# Patient Record
Sex: MALE | Race: WHITE | Employment: PART TIME | URBAN - METROPOLITAN AREA
[De-identification: names, ages, dates, MRNs, and addresses within clinical notes are randomized per-mention and may not be internally consistent; named-entity substitution may affect disease eponyms.]

---

## 2017-02-06 ENCOUNTER — TELEPHONE (OUTPATIENT)
Dept: FAMILY MEDICINE CLINIC | Facility: CLINIC | Age: 67
End: 2017-02-06

## 2017-02-07 NOTE — TELEPHONE ENCOUNTER
Notified patient that Dr Divina Olivas does not have the disc. Xrays were from [address] [facility].  Patient will contact that group and check his own records to see if he is in possession of disc

## 2017-02-13 ENCOUNTER — OFFICE VISIT (OUTPATIENT)
Dept: FAMILY MEDICINE CLINIC | Facility: CLINIC | Age: 67
End: 2017-02-13

## 2017-02-13 ENCOUNTER — TELEPHONE (OUTPATIENT)
Dept: FAMILY MEDICINE CLINIC | Facility: CLINIC | Age: 67
End: 2017-02-13

## 2017-02-13 VITALS
WEIGHT: 159 LBS | HEART RATE: 74 BPM | SYSTOLIC BLOOD PRESSURE: 110 MMHG | DIASTOLIC BLOOD PRESSURE: 72 MMHG | BODY MASS INDEX: 23 KG/M2 | RESPIRATION RATE: 16 BRPM | TEMPERATURE: 98 F

## 2017-02-13 DIAGNOSIS — R68.89 FLU-LIKE SYMPTOMS: Primary | ICD-10-CM

## 2017-02-13 DIAGNOSIS — N40.0 BENIGN NON-NODULAR PROSTATIC HYPERPLASIA WITHOUT LOWER URINARY TRACT SYMPTOMS: ICD-10-CM

## 2017-02-13 DIAGNOSIS — R10.9 BILATERAL FLANK PAIN: ICD-10-CM

## 2017-02-13 LAB
APPEARANCE: CLEAR
MULTISTIX LOT#: ABNORMAL NUMERIC
PH, URINE: 5.5 (ref 4.5–8)
SPECIFIC GRAVITY: 1.03 (ref 1–1.03)
URINE-COLOR: YELLOW
UROBILINOGEN,SEMI-QN: 0.2 MG/DL (ref 0–1.9)

## 2017-02-13 PROCEDURE — 81003 URINALYSIS AUTO W/O SCOPE: CPT | Performed by: FAMILY MEDICINE

## 2017-02-13 PROCEDURE — 87086 URINE CULTURE/COLONY COUNT: CPT | Performed by: FAMILY MEDICINE

## 2017-02-13 PROCEDURE — 99214 OFFICE O/P EST MOD 30 MIN: CPT | Performed by: FAMILY MEDICINE

## 2017-02-13 NOTE — PROGRESS NOTES
Chief Complaint:   Patient presents with:  Flu    HPI:   This is a 74year old male presenting with flulike symptoms for the past 1 week. He describes bilateral flank pain with radiation to suprapubic region.   Patient has a history of BPH he reports incre polydipsia, polyphagia and polyuria. Genitourinary: Positive for urgency, flank pain and nocturia. Negative for dysuria, hematuria and difficulty urinating. Musculoskeletal: Positive for myalgias and joint pain.  Negative for gait problem, neck pain and distension. There is no tenderness. There is no rebound and no guarding. Bilateral flank tenderness    Musculoskeletal: Normal range of motion. He exhibits no tenderness or effusion. Lymphadenopathy:     He has no cervical adenopathy.    Neurological: H

## 2017-02-14 ENCOUNTER — LAB ENCOUNTER (OUTPATIENT)
Dept: LAB | Age: 67
End: 2017-02-14
Attending: FAMILY MEDICINE
Payer: MEDICARE

## 2017-02-14 ENCOUNTER — TELEPHONE (OUTPATIENT)
Dept: FAMILY MEDICINE CLINIC | Facility: CLINIC | Age: 67
End: 2017-02-14

## 2017-02-14 ENCOUNTER — HOSPITAL ENCOUNTER (OUTPATIENT)
Dept: ULTRASOUND IMAGING | Age: 67
Discharge: HOME | End: 2017-02-14
Attending: FAMILY MEDICINE
Payer: MEDICARE

## 2017-02-14 DIAGNOSIS — R10.9 BILATERAL FLANK PAIN: ICD-10-CM

## 2017-02-14 DIAGNOSIS — R68.89 FLU-LIKE SYMPTOMS: ICD-10-CM

## 2017-02-14 DIAGNOSIS — N40.0 BENIGN NON-NODULAR PROSTATIC HYPERPLASIA WITHOUT LOWER URINARY TRACT SYMPTOMS: ICD-10-CM

## 2017-02-14 LAB
ALBUMIN SERPL-MCNC: 3.7 G/DL (ref 3.5–4.8)
ALP LIVER SERPL-CCNC: 61 U/L (ref 45–117)
ALT SERPL-CCNC: 25 U/L (ref 17–63)
AST SERPL-CCNC: 17 U/L (ref 15–41)
BASOPHILS # BLD AUTO: 0.04 X10(3) UL (ref 0–0.1)
BASOPHILS NFR BLD AUTO: 0.9 %
BILIRUB SERPL-MCNC: 0.4 MG/DL (ref 0.1–2)
BUN BLD-MCNC: 15 MG/DL (ref 8–20)
CALCIUM BLD-MCNC: 9.1 MG/DL (ref 8.3–10.3)
CHLORIDE: 100 MMOL/L (ref 101–111)
CO2: 34 MMOL/L (ref 22–32)
CREAT BLD-MCNC: 0.78 MG/DL (ref 0.7–1.3)
EOSINOPHIL # BLD AUTO: 0.2 X10(3) UL (ref 0–0.3)
EOSINOPHIL NFR BLD AUTO: 4.4 %
ERYTHROCYTE [DISTWIDTH] IN BLOOD BY AUTOMATED COUNT: 12.4 % (ref 11.5–16)
GLUCOSE BLD-MCNC: 105 MG/DL (ref 70–99)
HCT VFR BLD AUTO: 40.3 % (ref 37–53)
HGB BLD-MCNC: 13.7 G/DL (ref 13–17)
IMMATURE GRANULOCYTE COUNT: 0.01 X10(3) UL (ref 0–1)
IMMATURE GRANULOCYTE RATIO %: 0.2 %
LYMPHOCYTES # BLD AUTO: 1.8 X10(3) UL (ref 0.9–4)
LYMPHOCYTES NFR BLD AUTO: 39.6 %
M PROTEIN MFR SERPL ELPH: 7.5 G/DL (ref 6.1–8.3)
MCH RBC QN AUTO: 30.6 PG (ref 27–33.2)
MCHC RBC AUTO-ENTMCNC: 34 G/DL (ref 31–37)
MCV RBC AUTO: 90 FL (ref 80–99)
MONOCYTES # BLD AUTO: 0.72 X10(3) UL (ref 0.1–0.6)
MONOCYTES NFR BLD AUTO: 15.8 %
NEUTROPHIL ABS PRELIM: 1.78 X10 (3) UL (ref 1.3–6.7)
NEUTROPHILS # BLD AUTO: 1.78 X10(3) UL (ref 1.3–6.7)
NEUTROPHILS NFR BLD AUTO: 39.1 %
PLATELET # BLD AUTO: 272 10(3)UL (ref 150–450)
POTASSIUM SERPL-SCNC: 4.3 MMOL/L (ref 3.6–5.1)
PSA SERPL-MCNC: 2.7 NG/ML (ref 0.01–4)
RBC # BLD AUTO: 4.48 X10(6)UL (ref 3.8–5.8)
RED CELL DISTRIBUTION WIDTH-SD: 40.6 FL (ref 35.1–46.3)
SODIUM SERPL-SCNC: 137 MMOL/L (ref 136–144)
WBC # BLD AUTO: 4.6 X10(3) UL (ref 4–13)

## 2017-02-14 PROCEDURE — 76770 US EXAM ABDO BACK WALL COMP: CPT

## 2017-02-14 PROCEDURE — 84153 ASSAY OF PSA TOTAL: CPT

## 2017-02-14 PROCEDURE — 85025 COMPLETE CBC W/AUTO DIFF WBC: CPT

## 2017-02-14 PROCEDURE — 36415 COLL VENOUS BLD VENIPUNCTURE: CPT

## 2017-02-14 PROCEDURE — 80053 COMPREHEN METABOLIC PANEL: CPT

## 2017-02-14 RX ORDER — CIPROFLOXACIN 500 MG/1
500 TABLET, FILM COATED ORAL 2 TIMES DAILY
Qty: 20 TABLET | Refills: 0 | Status: SHIPPED | OUTPATIENT
Start: 2017-02-14 | End: 2017-02-24

## 2017-02-14 NOTE — TELEPHONE ENCOUNTER
----- Message from Jerri Zurita MD sent at 2/14/2017 11:21 AM CST -----  Enlarged prostate, why didn't patient get labs done? ? That was important.  Please send over cipro 500 mg po bid x 10 days until we get labs results, i'm treating him for possible prost

## 2017-02-14 NOTE — TELEPHONE ENCOUNTER
Called and spoke with pt. Pt states that he was not aware that he needed to have labs drawn. Pt states he will come in to the office today prior to four in order to have his labs drawn. Pt will also start abt. Abt sent to pharmacy.

## 2017-02-14 NOTE — TELEPHONE ENCOUNTER
Pt came in today for out patient testing and followed up on status of request for a note to clear him to go back to work. Please contact pt to inform.

## 2017-02-14 NOTE — TELEPHONE ENCOUNTER
Pr Dr. Jaquelin Cavazos, we will need the pt's lab results prior to pt receiving letter to return to work. Left detailed message on pt's vm stating that we will need to review lab results prior to pt receiving letter to return to work.  Pt advised to call the office

## 2017-02-15 NOTE — TELEPHONE ENCOUNTER
Pt called requesting letter to return back to work. [phone] [address] Spencer for letter? please advise.    Thank you

## 2017-02-15 NOTE — TELEPHONE ENCOUNTER
Pt calling back, labs were done yesterday and he would like to know if he will be cleared for work now. Please call on Home number.

## 2017-02-15 NOTE — TELEPHONE ENCOUNTER
Per kathia Vaughn for letter. Pt to only take cipro for 7 days not 10. Pt to let  know if symptoms persist after he's completed Rx. Pt informed of MD message. Pt states understanding and will  letter at  later today.    Pt has

## 2017-02-24 ENCOUNTER — TELEPHONE (OUTPATIENT)
Dept: FAMILY MEDICINE CLINIC | Facility: CLINIC | Age: 67
End: 2017-02-24

## 2017-03-02 ENCOUNTER — TELEPHONE (OUTPATIENT)
Dept: FAMILY MEDICINE CLINIC | Facility: CLINIC | Age: 67
End: 2017-03-02

## 2017-03-03 RX ORDER — METHOCARBAMOL 500 MG/1
500 TABLET, FILM COATED ORAL EVERY 8 HOURS PRN
COMMUNITY
End: 2017-03-31 | Stop reason: ALTCHOICE

## 2017-03-06 ENCOUNTER — HOSPITAL ENCOUNTER (OUTPATIENT)
Dept: PHYSICAL THERAPY | Facility: HOSPITAL | Age: 67
Setting detail: THERAPIES SERIES
Discharge: HOME | End: 2017-03-06
Attending: ORTHOPAEDIC SURGERY
Payer: MEDICARE

## 2017-03-06 ENCOUNTER — APPOINTMENT (OUTPATIENT)
Dept: LAB | Facility: HOSPITAL | Age: 67
End: 2017-03-06
Attending: ORTHOPAEDIC SURGERY
Payer: MEDICARE

## 2017-03-06 DIAGNOSIS — M16.11 PRIMARY OSTEOARTHRITIS OF RIGHT HIP: ICD-10-CM

## 2017-03-06 LAB
ANTIBODY SCREEN: NEGATIVE
APTT PPP: 31.2 SECONDS (ref 25–34)
ATRIAL RATE: 60 BPM
BILIRUB UR QL STRIP.AUTO: NEGATIVE
COLOR UR AUTO: YELLOW
GLUCOSE UR STRIP.AUTO-MCNC: NEGATIVE MG/DL
INR BLD: 0.94 (ref 0.89–1.11)
KETONES UR STRIP.AUTO-MCNC: NEGATIVE MG/DL
LEUKOCYTE ESTERASE UR QL STRIP.AUTO: NEGATIVE
NITRITE UR QL STRIP.AUTO: NEGATIVE
P AXIS: 71 DEGREES
P-R INTERVAL: 152 MS
PH UR STRIP.AUTO: 5 [PH] (ref 4.5–8)
PROT UR STRIP.AUTO-MCNC: NEGATIVE MG/DL
PSA SERPL DL<=0.01 NG/ML-MCNC: 12.6 SECONDS (ref 12–14.3)
Q-T INTERVAL: 402 MS
QRS DURATION: 96 MS
QTC CALCULATION (BEZET): 402 MS
R AXIS: 70 DEGREES
RBC UR QL AUTO: NEGATIVE
RH BLOOD TYPE: POSITIVE
SP GR UR STRIP.AUTO: 1.01 (ref 1–1.03)
T AXIS: 66 DEGREES
UROBILINOGEN UR STRIP.AUTO-MCNC: <2 MG/DL
VENTRICULAR RATE: 60 BPM

## 2017-03-06 PROCEDURE — 81003 URINALYSIS AUTO W/O SCOPE: CPT

## 2017-03-06 PROCEDURE — 87081 CULTURE SCREEN ONLY: CPT

## 2017-03-06 PROCEDURE — 36415 COLL VENOUS BLD VENIPUNCTURE: CPT

## 2017-03-06 PROCEDURE — 85610 PROTHROMBIN TIME: CPT

## 2017-03-06 PROCEDURE — 93010 ELECTROCARDIOGRAM REPORT: CPT | Performed by: INTERNAL MEDICINE

## 2017-03-06 PROCEDURE — 93005 ELECTROCARDIOGRAM TRACING: CPT

## 2017-03-06 PROCEDURE — 85730 THROMBOPLASTIN TIME PARTIAL: CPT

## 2017-03-06 PROCEDURE — 86901 BLOOD TYPING SEROLOGIC RH(D): CPT

## 2017-03-06 PROCEDURE — 86850 RBC ANTIBODY SCREEN: CPT

## 2017-03-06 PROCEDURE — 86900 BLOOD TYPING SEROLOGIC ABO: CPT

## 2017-03-14 ENCOUNTER — OFFICE VISIT (OUTPATIENT)
Dept: FAMILY MEDICINE CLINIC | Facility: CLINIC | Age: 67
End: 2017-03-14

## 2017-03-14 VITALS
DIASTOLIC BLOOD PRESSURE: 76 MMHG | SYSTOLIC BLOOD PRESSURE: 110 MMHG | BODY MASS INDEX: 23 KG/M2 | HEART RATE: 72 BPM | WEIGHT: 162 LBS | RESPIRATION RATE: 16 BRPM | TEMPERATURE: 98 F

## 2017-03-14 DIAGNOSIS — N40.0 BENIGN NON-NODULAR PROSTATIC HYPERPLASIA WITHOUT LOWER URINARY TRACT SYMPTOMS: ICD-10-CM

## 2017-03-14 DIAGNOSIS — Z01.818 PRE-OP EVALUATION: Primary | ICD-10-CM

## 2017-03-14 DIAGNOSIS — M16.11 PRIMARY OSTEOARTHRITIS OF RIGHT HIP: ICD-10-CM

## 2017-03-14 PROCEDURE — 99214 OFFICE O/P EST MOD 30 MIN: CPT | Performed by: FAMILY MEDICINE

## 2017-03-14 NOTE — PROGRESS NOTES
Roman Marte is a 74year old male who presents for a pre-operative physical exam.   HPI related to surgery:   Roman Marte is scheduled for a right hip replacement, anterior approach procedure to be performed by Dr Gabriella Cazares.    Indication: right hip arthri Pulse 72  Temp(Src) 97.9 °F (36.6 °C) (Oral)  Resp 16  Wt 162 lb   Vital signs: Blood pressure 110/76, pulse 72, temperature 97.9 °F (36.6 °C), temperature source Oral, resp. rate 16, weight 162 lb. General: No acute distress. Alert and oriented x 3.   LAINE

## 2017-03-22 ENCOUNTER — SURGERY (OUTPATIENT)
Age: 67
End: 2017-03-22

## 2017-03-22 ENCOUNTER — ANESTHESIA (OUTPATIENT)
Dept: SURGERY | Facility: HOSPITAL | Age: 67
DRG: 470 | End: 2017-03-22
Payer: MEDICARE

## 2017-03-22 ENCOUNTER — APPOINTMENT (OUTPATIENT)
Dept: GENERAL RADIOLOGY | Facility: HOSPITAL | Age: 67
DRG: 470 | End: 2017-03-22
Attending: ORTHOPAEDIC SURGERY
Payer: MEDICARE

## 2017-03-22 ENCOUNTER — HOSPITAL ENCOUNTER (INPATIENT)
Facility: HOSPITAL | Age: 67
LOS: 1 days | Discharge: HOME HEALTH | DRG: 470 | End: 2017-03-23
Attending: ORTHOPAEDIC SURGERY | Admitting: ORTHOPAEDIC SURGERY
Payer: MEDICARE

## 2017-03-22 ENCOUNTER — ANESTHESIA EVENT (OUTPATIENT)
Dept: SURGERY | Facility: HOSPITAL | Age: 67
DRG: 470 | End: 2017-03-22
Payer: MEDICARE

## 2017-03-22 DIAGNOSIS — M16.11 PRIMARY OSTEOARTHRITIS OF RIGHT HIP: Primary | ICD-10-CM

## 2017-03-22 PROCEDURE — 0SR90JZ REPLACEMENT OF RIGHT HIP JOINT WITH SYNTHETIC SUBSTITUTE, OPEN APPROACH: ICD-10-PCS | Performed by: ORTHOPAEDIC SURGERY

## 2017-03-22 PROCEDURE — 88311 DECALCIFY TISSUE: CPT | Performed by: ORTHOPAEDIC SURGERY

## 2017-03-22 PROCEDURE — 97161 PT EVAL LOW COMPLEX 20 MIN: CPT

## 2017-03-22 PROCEDURE — 76001 XR FLUOROSCOPE EXAM >1 HR EXTENSIVE (CPT=76001): CPT

## 2017-03-22 PROCEDURE — 3E0T3CZ INTRODUCTION OF REGIONAL ANESTHETIC INTO PERIPHERAL NERVES AND PLEXI, PERCUTANEOUS APPROACH: ICD-10-PCS | Performed by: ANESTHESIOLOGY

## 2017-03-22 PROCEDURE — 94664 DEMO&/EVAL PT USE INHALER: CPT

## 2017-03-22 PROCEDURE — 88304 TISSUE EXAM BY PATHOLOGIST: CPT | Performed by: ORTHOPAEDIC SURGERY

## 2017-03-22 PROCEDURE — 97530 THERAPEUTIC ACTIVITIES: CPT

## 2017-03-22 DEVICE — CORAIL HIP SYSTEM CEMENTLESS FEMORAL STEM 12/14 AMT 135 DEGREES KHO SIZE 14 HA COATED HIGH OFFSET NO COLLAR
Type: IMPLANTABLE DEVICE | Site: HIP | Status: FUNCTIONAL
Brand: CORAIL

## 2017-03-22 DEVICE — PINNACLE POROCOAT ACETABULAR SHELL SECTOR II 56MM OD
Type: IMPLANTABLE DEVICE | Site: HIP | Status: FUNCTIONAL
Brand: PINNACLE POROCOAT

## 2017-03-22 DEVICE — BIOLOX DELTA CERAMIC FEMORAL HEAD +1.5 36MM DIA 12/14 TAPER
Type: IMPLANTABLE DEVICE | Site: HIP | Status: FUNCTIONAL
Brand: BIOLOX DELTA

## 2017-03-22 DEVICE — PINNACLE HIP SOLUTIONS ALTRX POLYETHYLENE ACETABULAR LINER +4 NEUTRAL 36MM ID 54MM OD
Type: IMPLANTABLE DEVICE | Site: HIP | Status: FUNCTIONAL
Brand: PINNACLE ALTRX

## 2017-03-22 RX ORDER — HYDROMORPHONE HYDROCHLORIDE 1 MG/ML
0.2 INJECTION, SOLUTION INTRAMUSCULAR; INTRAVENOUS; SUBCUTANEOUS EVERY 2 HOUR PRN
Status: DISCONTINUED | OUTPATIENT
Start: 2017-03-22 | End: 2017-03-23

## 2017-03-22 RX ORDER — HYDROMORPHONE HYDROCHLORIDE 1 MG/ML
0.4 INJECTION, SOLUTION INTRAMUSCULAR; INTRAVENOUS; SUBCUTANEOUS EVERY 5 MIN PRN
Status: DISCONTINUED | OUTPATIENT
Start: 2017-03-22 | End: 2017-03-22 | Stop reason: HOSPADM

## 2017-03-22 RX ORDER — OXYCODONE HYDROCHLORIDE 10 MG/1
20 TABLET ORAL EVERY 4 HOURS PRN
Status: DISCONTINUED | OUTPATIENT
Start: 2017-03-22 | End: 2017-03-23

## 2017-03-22 RX ORDER — MIDAZOLAM HYDROCHLORIDE 1 MG/ML
1 INJECTION INTRAMUSCULAR; INTRAVENOUS EVERY 5 MIN PRN
Status: DISCONTINUED | OUTPATIENT
Start: 2017-03-22 | End: 2017-03-22 | Stop reason: HOSPADM

## 2017-03-22 RX ORDER — POLYETHYLENE GLYCOL 3350 17 G/17G
17 POWDER, FOR SOLUTION ORAL DAILY PRN
Status: DISCONTINUED | OUTPATIENT
Start: 2017-03-22 | End: 2017-03-23

## 2017-03-22 RX ORDER — MELATONIN
325
Status: DISCONTINUED | OUTPATIENT
Start: 2017-03-23 | End: 2017-03-23

## 2017-03-22 RX ORDER — CYCLOBENZAPRINE HCL 5 MG
5 TABLET ORAL 3 TIMES DAILY PRN
Status: DISCONTINUED | OUTPATIENT
Start: 2017-03-22 | End: 2017-03-23

## 2017-03-22 RX ORDER — METOCLOPRAMIDE HYDROCHLORIDE 5 MG/ML
10 INJECTION INTRAMUSCULAR; INTRAVENOUS AS NEEDED
Status: DISCONTINUED | OUTPATIENT
Start: 2017-03-22 | End: 2017-03-22 | Stop reason: HOSPADM

## 2017-03-22 RX ORDER — OXYCODONE HCL 10 MG/1
10 TABLET, FILM COATED, EXTENDED RELEASE ORAL
Status: COMPLETED | OUTPATIENT
Start: 2017-03-22 | End: 2017-03-22

## 2017-03-22 RX ORDER — SODIUM PHOSPHATE, DIBASIC AND SODIUM PHOSPHATE, MONOBASIC 7; 19 G/133ML; G/133ML
1 ENEMA RECTAL ONCE AS NEEDED
Status: ACTIVE | OUTPATIENT
Start: 2017-03-22 | End: 2017-03-22

## 2017-03-22 RX ORDER — FERROUS SULFATE 325(65) MG
1 TABLET ORAL DAILY
Qty: 30 TABLET | Refills: 0 | Status: SHIPPED | OUTPATIENT
Start: 2017-03-22 | End: 2017-05-03 | Stop reason: ALTCHOICE

## 2017-03-22 RX ORDER — NAPROXEN 250 MG/1
250 TABLET ORAL 3 TIMES DAILY PRN
Status: ON HOLD | COMMUNITY
End: 2017-03-23

## 2017-03-22 RX ORDER — ONDANSETRON 2 MG/ML
4 INJECTION INTRAMUSCULAR; INTRAVENOUS EVERY 4 HOURS PRN
Status: DISCONTINUED | OUTPATIENT
Start: 2017-03-22 | End: 2017-03-23

## 2017-03-22 RX ORDER — SODIUM CHLORIDE, SODIUM LACTATE, POTASSIUM CHLORIDE, CALCIUM CHLORIDE 600; 310; 30; 20 MG/100ML; MG/100ML; MG/100ML; MG/100ML
INJECTION, SOLUTION INTRAVENOUS CONTINUOUS
Status: DISCONTINUED | OUTPATIENT
Start: 2017-03-22 | End: 2017-03-23

## 2017-03-22 RX ORDER — KETOROLAC TROMETHAMINE 30 MG/ML
15 INJECTION, SOLUTION INTRAMUSCULAR; INTRAVENOUS EVERY 6 HOURS
Status: COMPLETED | OUTPATIENT
Start: 2017-03-22 | End: 2017-03-23

## 2017-03-22 RX ORDER — HYDROMORPHONE HYDROCHLORIDE 1 MG/ML
0.4 INJECTION, SOLUTION INTRAMUSCULAR; INTRAVENOUS; SUBCUTANEOUS EVERY 2 HOUR PRN
Status: DISCONTINUED | OUTPATIENT
Start: 2017-03-22 | End: 2017-03-23

## 2017-03-22 RX ORDER — SENNOSIDES 8.6 MG
17.2 TABLET ORAL NIGHTLY
Status: DISCONTINUED | OUTPATIENT
Start: 2017-03-22 | End: 2017-03-23

## 2017-03-22 RX ORDER — ONDANSETRON 2 MG/ML
4 INJECTION INTRAMUSCULAR; INTRAVENOUS AS NEEDED
Status: DISCONTINUED | OUTPATIENT
Start: 2017-03-22 | End: 2017-03-22 | Stop reason: HOSPADM

## 2017-03-22 RX ORDER — DIPHENHYDRAMINE HCL 25 MG
25 CAPSULE ORAL EVERY 4 HOURS PRN
Status: DISCONTINUED | OUTPATIENT
Start: 2017-03-22 | End: 2017-03-23

## 2017-03-22 RX ORDER — HYDROCODONE BITARTRATE AND ACETAMINOPHEN 10; 325 MG/1; MG/1
1-2 TABLET ORAL EVERY 4 HOURS PRN
Qty: 60 TABLET | Refills: 0 | Status: SHIPPED | OUTPATIENT
Start: 2017-03-31 | End: 2017-05-03 | Stop reason: ALTCHOICE

## 2017-03-22 RX ORDER — OXYCODONE HYDROCHLORIDE 15 MG/1
15 TABLET ORAL EVERY 4 HOURS PRN
Status: DISCONTINUED | OUTPATIENT
Start: 2017-03-22 | End: 2017-03-23

## 2017-03-22 RX ORDER — HYDROMORPHONE HYDROCHLORIDE 1 MG/ML
0.5 INJECTION, SOLUTION INTRAMUSCULAR; INTRAVENOUS; SUBCUTANEOUS EVERY 2 HOUR PRN
Status: DISCONTINUED | OUTPATIENT
Start: 2017-03-22 | End: 2017-03-23

## 2017-03-22 RX ORDER — ACETAMINOPHEN 325 MG/1
650 TABLET ORAL ONCE
Status: COMPLETED | OUTPATIENT
Start: 2017-03-22 | End: 2017-03-22

## 2017-03-22 RX ORDER — DIPHENHYDRAMINE HYDROCHLORIDE 50 MG/ML
25 INJECTION INTRAMUSCULAR; INTRAVENOUS ONCE AS NEEDED
Status: ACTIVE | OUTPATIENT
Start: 2017-03-22 | End: 2017-03-22

## 2017-03-22 RX ORDER — ACETAMINOPHEN 325 MG/1
650 TABLET ORAL 4 TIMES DAILY
Status: DISCONTINUED | OUTPATIENT
Start: 2017-03-22 | End: 2017-03-23

## 2017-03-22 RX ORDER — ACETAMINOPHEN 325 MG/1
TABLET ORAL
Status: COMPLETED
Start: 2017-03-22 | End: 2017-03-22

## 2017-03-22 RX ORDER — OXYCODONE HCL 10 MG/1
10 TABLET, FILM COATED, EXTENDED RELEASE ORAL
Status: DISCONTINUED | OUTPATIENT
Start: 2017-03-22 | End: 2017-03-23

## 2017-03-22 RX ORDER — NALOXONE HYDROCHLORIDE 0.4 MG/ML
80 INJECTION, SOLUTION INTRAMUSCULAR; INTRAVENOUS; SUBCUTANEOUS AS NEEDED
Status: DISCONTINUED | OUTPATIENT
Start: 2017-03-22 | End: 2017-03-22 | Stop reason: HOSPADM

## 2017-03-22 RX ORDER — OXYCODONE HYDROCHLORIDE 5 MG/1
5 TABLET ORAL EVERY 4 HOURS PRN
Status: DISCONTINUED | OUTPATIENT
Start: 2017-03-22 | End: 2017-03-23

## 2017-03-22 RX ORDER — METOCLOPRAMIDE HYDROCHLORIDE 5 MG/ML
10 INJECTION INTRAMUSCULAR; INTRAVENOUS EVERY 6 HOURS PRN
Status: DISCONTINUED | OUTPATIENT
Start: 2017-03-22 | End: 2017-03-23

## 2017-03-22 RX ORDER — SODIUM CHLORIDE 9 MG/ML
INJECTION, SOLUTION INTRAVENOUS CONTINUOUS
Status: DISCONTINUED | OUTPATIENT
Start: 2017-03-22 | End: 2017-03-23

## 2017-03-22 RX ORDER — OXYCODONE HYDROCHLORIDE 10 MG/1
10 TABLET ORAL EVERY 4 HOURS PRN
Status: DISCONTINUED | OUTPATIENT
Start: 2017-03-22 | End: 2017-03-23

## 2017-03-22 RX ORDER — DIPHENHYDRAMINE HYDROCHLORIDE 50 MG/ML
12.5 INJECTION INTRAMUSCULAR; INTRAVENOUS EVERY 4 HOURS PRN
Status: DISCONTINUED | OUTPATIENT
Start: 2017-03-22 | End: 2017-03-23

## 2017-03-22 RX ORDER — DOCUSATE SODIUM 100 MG/1
100 CAPSULE, LIQUID FILLED ORAL 2 TIMES DAILY
Qty: 60 CAPSULE | Refills: 0 | Status: SHIPPED | OUTPATIENT
Start: 2017-03-22 | End: 2017-05-03 | Stop reason: ALTCHOICE

## 2017-03-22 RX ORDER — LABETALOL HYDROCHLORIDE 5 MG/ML
5 INJECTION, SOLUTION INTRAVENOUS EVERY 5 MIN PRN
Status: DISCONTINUED | OUTPATIENT
Start: 2017-03-22 | End: 2017-03-22 | Stop reason: HOSPADM

## 2017-03-22 RX ORDER — HYDROCODONE BITARTRATE AND ACETAMINOPHEN 10; 325 MG/1; MG/1
1-2 TABLET ORAL EVERY 4 HOURS PRN
Qty: 80 TABLET | Refills: 0 | Status: SHIPPED | OUTPATIENT
Start: 2017-03-22 | End: 2017-05-03 | Stop reason: ALTCHOICE

## 2017-03-22 RX ORDER — BISACODYL 10 MG
10 SUPPOSITORY, RECTAL RECTAL
Status: DISCONTINUED | OUTPATIENT
Start: 2017-03-22 | End: 2017-03-23

## 2017-03-22 RX ORDER — DOCUSATE SODIUM 100 MG/1
100 CAPSULE, LIQUID FILLED ORAL 2 TIMES DAILY
Status: DISCONTINUED | OUTPATIENT
Start: 2017-03-22 | End: 2017-03-23

## 2017-03-22 RX ORDER — HYDROMORPHONE HYDROCHLORIDE 1 MG/ML
0.3 INJECTION, SOLUTION INTRAMUSCULAR; INTRAVENOUS; SUBCUTANEOUS EVERY 2 HOUR PRN
Status: DISCONTINUED | OUTPATIENT
Start: 2017-03-22 | End: 2017-03-23

## 2017-03-22 NOTE — PHYSICAL THERAPY NOTE
PHYSICAL THERAPY HIP EVALUATION - INPATIENT     Room Number: 377/377-A  Evaluation Date: 3/22/2017  Type of Evaluation: Initial  Physician Order: PT Eval and Treat    Presenting Problem: s/p Right direct anterior ЮЛИЯ on 3/22/17  Reason for Therapy: Donnette Severin techniques;Relaxation;Repositioning    COGNITION  · Overall Cognitive Status:  WFL - within functional limits    RANGE OF MOTION AND STRENGTH ASSESSMENT  Upper extremity ROM and strength are within functional limits     Lower extremity ROM is within functi instructed in anterior hip precautions, verbalized and demonstrate understanding multiple times throughout session. Patient was instructed in weight bearing status, WBAT, verbalized and demonstrated understanding.  Handout was issued to patient, patient suzanne re-educate;Strengthening;Stoop training;Stair training;Transfer training;Balance training  Rehab Potential : Good  Frequency (Obs): BID  Number of Visits to Meet Established Goals: 5      CURRENT GOALS  Goal #1  Patient is able to demonstrate supine - sit

## 2017-03-22 NOTE — H&P
Lizz Nowak   3/14/2017 1:00 PM   Office Visit   MRN:  [id number]    Description: 74year old male   Provider: Stefany Begum MD   Department: [facility]             Scanning Cover Sheet         Click to print Barcode Encounter Cover Sheet for scanning exam.    HPI related to surgery:    Hoa Knowles is scheduled for a right hip replacement, anterior approach procedure to be performed by Dr Jerry Granados.    Indication: right hip arthritis  Patient reports previous anesthesia:  No.  Previous complications: N Temp(Src) 97.9 °F (36.6 °C) (Oral)  Resp 16  Wt 162 lb   Vital signs: Blood pressure 110/76, pulse 72, temperature 97.9 °F (36.6 °C), temperature source Oral, resp. rate 16, weight 162 lb. General: No acute distress. Alert and oriented x 3.   HEENT: Moist Class:  Historical     Route: Oral     methocarbamol 500 MG Oral Tab  (Taking)         Sig: Take 500 mg by mouth every 8 (eight) hours as needed.     Class: Historical     Route: Oral     Multiple Vitamin (MULTI VITAMIN MENS OR)  (Taking)   04/18/2016      Scan on 2/13/2017 2:02 PM         [facility] Patient Demo Form - Scan on 2/13/2017 2:01 [facility] Patient Demo Form - Scan on 2/13/2017 2:01 PM         [facility] Patient Demo Form - Scan on 2/13/2017 12:00 AMEMG Patient Demo Form - Scan on 2/13/2017 12:00 AM         Treatm MD

## 2017-03-22 NOTE — ANESTHESIA PREPROCEDURE EVALUATION
PRE-OP EVALUATION    Patient Name: Hardeman Soldcosta    Pre-op Diagnosis: right hip osteoarthritis    Procedure(s):  RIGHT ANTERIOR HIP REPLACEMENT    Surgeon(s) and Role:     Mandeep Walls MD - Primary    Pre-op vitals reviewed.   Temp: 97.3 °F (36.3 °C)  Pulse RBC 4.48 02/14/2017   HGB 13.7 02/14/2017   HCT 40.3 02/14/2017   MCV 90.0 02/14/2017   MCH 30.6 02/14/2017   MCHC 34.0 02/14/2017   RDW 12.4 02/14/2017   .0 02/14/2017       Lab Results  Component Value Date    02/14/2017   K 4.3 02/14/2017

## 2017-03-22 NOTE — ANESTHESIA POSTPROCEDURE EVALUATION
[facility] Patient Status:  Surgery Admit   Age/Gender 74year old male MRN [id number]   [facility] SURGERY Attending Matt Ron MD   Hosp Day # 0 PCP Patrick Guy MD       Anesthesia Post-op Note    Procedure(s):  R

## 2017-03-22 NOTE — OPERATIVE REPORT
TOTAL HIP REPLACEMENT OPERATIVE REPORT    Felisa Poole       [id number]     6/16/1950    PRE-OP DX:  RIGHT HIP PRIMARY OSTEOARTHRITIS  POST-OP DX:  RIGHT HIP PRIMARY OSTEOARTHRITIS  PROCEDURE:  DIRECT ANTERIOR RIGHT TOTAL HIP REPLACEMENT  SURGEON:  Marleen Rogel DIOGO.  FEMORAL HEAD WAS REMOVED WITH A CORK SCREW. POSTERIOR AND INFERIOR ACETABULAR RETRACTORS WERE PLACED CAREFULLY. GOOD ACETABULAR EXPOSURE WAS OBTAINED. LABRAL TISSUE WAS EXCISED. MEDIAL WALL WAS IDENTIFIED AND CLEARED OF SOFT TISSUES.   Trevin Lomeli GOOD TENSION. ALL THE TRIAL IMPLANTS WERE REMOVED. WOUND WAS IRRIGATED COPIOUSLY. HEMOSTASIS WAS OBTAINED. ACETABULUM WAS REEXPOSED. REAL LINER WAS IMPACTED. ITS SEATING WAS VERIFIED. PROXIMAL FEMUR WAS EXPOSED.   REAL FEMORAL STEM WAS INSERTED WITH

## 2017-03-23 VITALS
SYSTOLIC BLOOD PRESSURE: 101 MMHG | DIASTOLIC BLOOD PRESSURE: 58 MMHG | WEIGHT: 158 LBS | RESPIRATION RATE: 16 BRPM | OXYGEN SATURATION: 100 % | BODY MASS INDEX: 22.62 KG/M2 | TEMPERATURE: 98 F | HEIGHT: 70 IN | HEART RATE: 83 BPM

## 2017-03-23 PROCEDURE — 97535 SELF CARE MNGMENT TRAINING: CPT

## 2017-03-23 PROCEDURE — 97150 GROUP THERAPEUTIC PROCEDURES: CPT

## 2017-03-23 PROCEDURE — 85027 COMPLETE CBC AUTOMATED: CPT | Performed by: ORTHOPAEDIC SURGERY

## 2017-03-23 PROCEDURE — 97116 GAIT TRAINING THERAPY: CPT

## 2017-03-23 PROCEDURE — 97165 OT EVAL LOW COMPLEX 30 MIN: CPT

## 2017-03-23 RX ORDER — NAPROXEN 250 MG/1
250 TABLET ORAL 3 TIMES DAILY PRN
Qty: 1 TABLET | Refills: 0 | Status: SHIPPED
Start: 2017-03-23 | End: 2017-03-31 | Stop reason: ALTCHOICE

## 2017-03-23 RX ORDER — POLYETHYLENE GLYCOL 3350 17 G/17G
17 POWDER, FOR SOLUTION ORAL DAILY PRN
Qty: 10 EACH | Refills: 0 | Status: SHIPPED | COMMUNITY
Start: 2017-03-23 | End: 2017-03-31 | Stop reason: ALTCHOICE

## 2017-03-23 RX ORDER — HYDROCODONE BITARTRATE AND ACETAMINOPHEN 10; 325 MG/1; MG/1
2 TABLET ORAL EVERY 4 HOURS PRN
Status: DISCONTINUED | OUTPATIENT
Start: 2017-03-23 | End: 2017-03-23

## 2017-03-23 RX ORDER — HYDROCODONE BITARTRATE AND ACETAMINOPHEN 10; 325 MG/1; MG/1
1 TABLET ORAL EVERY 4 HOURS PRN
Status: DISCONTINUED | OUTPATIENT
Start: 2017-03-23 | End: 2017-03-23

## 2017-03-23 NOTE — CM/SW NOTE
03/23/17 1459   Discharge disposition   Discharged to: Home-Health   Name of Facillity/Home Care/Hospice Residential   Discharge transportation Private car

## 2017-03-23 NOTE — PROGRESS NOTES
PT RESTING IN BED, EASY NON LABORED BREATHING ON 02 2LNC. VS WNL. TEDS AND SCDS IN PLACE, IV FLUIDS INFUSING WITHOUT DIFFICULTY. PT TOLERATING REGULAR DIET. VOIDS WITHOUT DIFFICULTY, UP WITH SB ASSIST AND WALKER. RIGHT HIP WITH DRESSING IN PLACE. ABD.  PILL

## 2017-03-23 NOTE — PROGRESS NOTES
Patient and  attended group discharge education class. Discharge education provided utilizing \"hip/knee replacement discharge instructions\" sheet. Teach back done. Questions solicited and answered. Tolerated activity well.

## 2017-03-23 NOTE — PROGRESS NOTES
Operative leg measured for Tubigrip. Applied to right leg. Patient and spouse instructed; verbalized understanding.

## 2017-03-23 NOTE — OCCUPATIONAL THERAPY NOTE
OCCUPATIONAL THERAPY QUICK EVALUATION - INPATIENT    Room Number: 377/377-A  Evaluation Date: 3/23/2017     Type of Evaluation: Quick Eval  Presenting Problem: s/p R ЮЛИЯ 3/22/17    Physician Order: IP Consult to Occupational Therapy  Reason for Therapy:  A help from another person does the patient currently need…  -   Putting on and taking off regular lower body clothing?: A Little (supervision)  -   Bathing (including washing, rinsing, drying)?: A Little (supervision)  -   Toileting, which includes using to addressed;SCDs in place; Ice applied    ASSESSMENT   Patient seen for OT services this am. In this session patient making good progress towards and has met all OT goals.  Patient performed all ADL tasks, functional transfers, dynamic reaching and functional

## 2017-03-23 NOTE — PHYSICAL THERAPY NOTE
PHYSICAL THERAPY HIP TREATMENT NOTE - INPATIENT      Room Number: 377/377-A     Session: 1 and 2   Number of Visits to Meet Established Goals: 5    Presenting Problem: s/p Right direct anterior ЮЛИЯ on 3/22/17    Problem List  Active Problems:    * No activ (AM-PAC Scale): 45.44   CMS Modifier (G-Code): CK    FUNCTIONAL ABILITY STATUS  Gait Assessment   Gait Assistance: Supervision  Distance (ft): 400  Assistive Device: Rolling walker  Pattern: R Decreased stance time  Stoop/Curb Assistance: Not tested (stair training, and BLE strengthening: S/P R ЮЛИЯ - Anterior. At this time, Pt. presents with decreased balance, impaired strength, difficulty with gait/transfers resulting in downgrade of overall functional mobility.   Due to above deficits, Pt will benefit

## 2017-03-23 NOTE — HOME CARE LIAISON
Received referral for [facility] on d/c for SN/PT. Met with patient who is agreeable to [facility] on d/c. Agency brochure given to patient. Referral sent to [facility] via [address]    Thank you for this referral,   Katherine Mathews

## 2017-03-23 NOTE — PROGRESS NOTES
[facility] Hospitalist Progress Note                                                                   [facility]    Jud Crawford  6/16/1950    SUBJECTIVE:  Doing well. Pain controlled.       OBJECTIVE:  Temp:  [

## 2017-03-23 NOTE — CM/SW NOTE
03/23/17 1100   CM/SW Referral Data   Referral Source Physician   Reason for Referral Discharge planning   Informant Patient  [facility]. -  [facility]   Pertinent Medical Hx   Primary Care Physician Name Digna Koch   Patient Info   Patient's Mental Status

## 2017-03-23 NOTE — PROGRESS NOTES
Orthopedic surgery progress note    Joyce Clayton Patient Status:  Inpatient    1950 MRN [id number]   [facility] 3SW-A Attending Tevin Burton MD   Hosp Day # 1 PCP Tres Antunez MD       Subjective:  Decided on home discharge.   No ma

## 2017-03-23 NOTE — PLAN OF CARE
PAIN - ADULT    • Verbalizes/displays adequate comfort level or patient's stated pain goal Progressing        Patient/Family Goals    • Patient/Family Long Term Goal Progressing        SAFETY ADULT - FALL    • Free from fall injury Progressing

## 2017-03-31 ENCOUNTER — OFFICE VISIT (OUTPATIENT)
Dept: FAMILY MEDICINE CLINIC | Facility: CLINIC | Age: 67
End: 2017-03-31

## 2017-03-31 VITALS
DIASTOLIC BLOOD PRESSURE: 80 MMHG | WEIGHT: 167 LBS | SYSTOLIC BLOOD PRESSURE: 122 MMHG | RESPIRATION RATE: 16 BRPM | OXYGEN SATURATION: 98 % | HEART RATE: 78 BPM | BODY MASS INDEX: 23.91 KG/M2 | HEIGHT: 70 IN | TEMPERATURE: 98 F

## 2017-03-31 DIAGNOSIS — Z78.9 DEEP VEIN THROMBOSIS (DVT) PROPHYLAXIS PRESCRIBED AT DISCHARGE: ICD-10-CM

## 2017-03-31 DIAGNOSIS — G89.29 CHRONIC RIGHT HIP PAIN: Primary | ICD-10-CM

## 2017-03-31 DIAGNOSIS — M25.551 CHRONIC RIGHT HIP PAIN: Primary | ICD-10-CM

## 2017-03-31 DIAGNOSIS — K59.09 OTHER CONSTIPATION: ICD-10-CM

## 2017-03-31 DIAGNOSIS — Z96.641 S/P HIP REPLACEMENT, RIGHT: ICD-10-CM

## 2017-03-31 PROCEDURE — 99214 OFFICE O/P EST MOD 30 MIN: CPT | Performed by: FAMILY MEDICINE

## 2017-03-31 NOTE — PROGRESS NOTES
Deanne Barton is a 74year old male who presents for Patient presents with:  ER F/U: right hip surgery    HPI:   Patient's presenting for follow up from Hospital     Patient was in Hospital/ER: date(s) 3/22/17    Patient reports overall improvement.      Medi rivaroxaban 10 MG Oral Tab Take 1 tablet (10 mg total) by mouth daily.  Disp: 30 tablet Rfl: 0      Past Medical History   Diagnosis Date   • Arthritis    • Visual impairment      glasses   • Problems with swallowing      chokes easily-needs to drink/eat He has no rales. He exhibits no tenderness. Abdominal: Soft. Bowel sounds are normal. He exhibits no distension and no mass. There is no tenderness. There is no rebound and no guarding. Musculoskeletal: He exhibits no edema or tenderness.         Right

## 2017-04-05 ENCOUNTER — OFFICE VISIT (OUTPATIENT)
Dept: PHYSICAL THERAPY | Age: 67
End: 2017-04-05
Attending: ORTHOPAEDIC SURGERY
Payer: MEDICARE

## 2017-04-05 DIAGNOSIS — M16.11 PRIMARY OSTEOARTHRITIS OF RIGHT HIP: Primary | ICD-10-CM

## 2017-04-05 PROBLEM — Z96.641 STATUS POST TOTAL REPLACEMENT OF RIGHT HIP: Status: ACTIVE | Noted: 2017-04-05

## 2017-04-05 PROCEDURE — 97530 THERAPEUTIC ACTIVITIES: CPT

## 2017-04-05 PROCEDURE — 97161 PT EVAL LOW COMPLEX 20 MIN: CPT

## 2017-04-05 NOTE — PROGRESS NOTES
LOWER EXTREMITY EVALUATION:   Referring Physician: Dr. Gwyn Whalen  Diagnosis: Right Total Hip Arthroplasty     Date of Service: 4/5/2017     PATIENT SUMMARY   Lucian Man is a 74year old y/o male who presents to therapy today with complaints of right sided hip Accessory motion: Deferred. Flexibility:  Moderate restrictions of the bilateral hip flexors R>>L. Strength/MMT: Strength is full across the knee and ankle. 3+/5 for right hip extension and ABD.       Special tests: Deferred secondary to post s my care.     X___________________________________________________ Date____________________    Certification From: 4/4/5502  To:7/4/2017

## 2017-04-07 ENCOUNTER — OFFICE VISIT (OUTPATIENT)
Dept: PHYSICAL THERAPY | Age: 67
End: 2017-04-07
Attending: ORTHOPAEDIC SURGERY
Payer: MEDICARE

## 2017-04-07 PROCEDURE — 97110 THERAPEUTIC EXERCISES: CPT

## 2017-04-07 PROCEDURE — 97140 MANUAL THERAPY 1/> REGIONS: CPT

## 2017-04-07 NOTE — PROGRESS NOTES
Dx: Right Total Hip Arthroplasty         Authorized # of Visits:  12         Next MD visit: none scheduled  Fall Risk: standard         Precautions: n/a             Subjective: States that he is doing better. Moving with greater ease at home.       Rusty Rodriguez

## 2017-04-11 ENCOUNTER — APPOINTMENT (OUTPATIENT)
Dept: PHYSICAL THERAPY | Age: 67
End: 2017-04-11
Attending: ORTHOPAEDIC SURGERY
Payer: MEDICARE

## 2017-04-11 ENCOUNTER — OFFICE VISIT (OUTPATIENT)
Dept: PHYSICAL THERAPY | Age: 67
End: 2017-04-11
Attending: ORTHOPAEDIC SURGERY
Payer: MEDICARE

## 2017-04-11 PROCEDURE — 97140 MANUAL THERAPY 1/> REGIONS: CPT

## 2017-04-11 PROCEDURE — 97110 THERAPEUTIC EXERCISES: CPT

## 2017-04-11 NOTE — PROGRESS NOTES
Dx: Right Total Hip Arthroplasty         Authorized # of Visits:  12         Next MD visit: none scheduled  Fall Risk: standard         Precautions: n/a             Subjective: States that he is doing better.   Having some soreness with his knee on the oper

## 2017-04-14 ENCOUNTER — OFFICE VISIT (OUTPATIENT)
Dept: PHYSICAL THERAPY | Age: 67
End: 2017-04-14
Attending: ORTHOPAEDIC SURGERY
Payer: MEDICARE

## 2017-04-14 PROCEDURE — 97110 THERAPEUTIC EXERCISES: CPT

## 2017-04-14 PROCEDURE — 97140 MANUAL THERAPY 1/> REGIONS: CPT

## 2017-04-14 NOTE — PROGRESS NOTES
Dx: Right Total Hip Arthroplasty         Authorized # of Visits:  12         Next MD visit: none scheduled  Fall Risk: standard         Precautions: n/a             Subjective: Hip is feeling good.   States that he is getting to the mindset away from [employer]

## 2017-04-18 ENCOUNTER — OFFICE VISIT (OUTPATIENT)
Dept: PHYSICAL THERAPY | Age: 67
End: 2017-04-18
Attending: ORTHOPAEDIC SURGERY
Payer: MEDICARE

## 2017-04-18 PROCEDURE — 97140 MANUAL THERAPY 1/> REGIONS: CPT

## 2017-04-18 PROCEDURE — 97110 THERAPEUTIC EXERCISES: CPT

## 2017-04-18 NOTE — PROGRESS NOTES
Dx: Right Total Hip Arthroplasty         Authorized # of Visits:  12         Next MD visit: none scheduled  Fall Risk: standard         Precautions: n/a             Subjective: Hip is feeling good. Forgot his cane today.   Feeling pretty safe despite forge tolerated. Charges: 2 TherEx (25 min);  1 Manual PT (10 min)       Total Timed Treatment: 40 min  Total Treatment Time: 40 min

## 2017-04-25 ENCOUNTER — OFFICE VISIT (OUTPATIENT)
Dept: PHYSICAL THERAPY | Age: 67
End: 2017-04-25
Attending: ORTHOPAEDIC SURGERY
Payer: MEDICARE

## 2017-04-25 PROCEDURE — 97140 MANUAL THERAPY 1/> REGIONS: CPT

## 2017-04-25 PROCEDURE — 97110 THERAPEUTIC EXERCISES: CPT

## 2017-04-25 NOTE — PROGRESS NOTES
Dx: Right Total Hip Arthroplasty         Authorized # of Visits:  12         Next MD visit: none scheduled  Fall Risk: standard         Precautions: n/a             Subjective: Hip is a bit sore today for unknown reason. Wants to begin walking again. of motion assessed. (progressing)  3. Patient will return to unlimited community ambulator and return to his speed walking exercise routine.  (progressing)  4.  Patient will be able to complete all exercises for LE proprioception and balance drills without

## 2017-04-28 ENCOUNTER — OFFICE VISIT (OUTPATIENT)
Dept: PHYSICAL THERAPY | Age: 67
End: 2017-04-28
Attending: ORTHOPAEDIC SURGERY
Payer: MEDICARE

## 2017-04-28 PROBLEM — D50.9 IRON DEFICIENCY ANEMIA: Status: ACTIVE | Noted: 2017-04-28

## 2017-04-28 PROCEDURE — 97140 MANUAL THERAPY 1/> REGIONS: CPT

## 2017-04-28 PROCEDURE — 97110 THERAPEUTIC EXERCISES: CPT

## 2017-04-28 NOTE — PROGRESS NOTES
Dx: Right Total Hip Arthroplasty         Authorized # of Visits:  12         Next MD visit: none scheduled  Fall Risk: standard         Precautions: n/a             Subjective: States he is having increased sensitivity of the right LE, not so much the righ increase ih hip pain. Gait continues to improve with increased levelness of the pelvis with gait. Needs increased hip extension with gait. Goals:   1. Increase FOTO >11% from INE.    2. Increase hip MMT to >4/5 for all planes of motion assessed. (pro

## 2017-05-02 ENCOUNTER — OFFICE VISIT (OUTPATIENT)
Dept: PHYSICAL THERAPY | Age: 67
End: 2017-05-02
Attending: ORTHOPAEDIC SURGERY
Payer: MEDICARE

## 2017-05-02 ENCOUNTER — MA CHART PREP (OUTPATIENT)
Dept: FAMILY MEDICINE CLINIC | Facility: CLINIC | Age: 67
End: 2017-05-02

## 2017-05-02 PROCEDURE — 97110 THERAPEUTIC EXERCISES: CPT

## 2017-05-02 PROCEDURE — 97140 MANUAL THERAPY 1/> REGIONS: CPT

## 2017-05-02 NOTE — PROGRESS NOTES
Dx: Right Total Hip Arthroplasty         Authorized # of Visits:  12         Next MD visit: none scheduled  Fall Risk: standard         Precautions: n/a             Subjective: Feeling good.   States he is feeling more confident and is able to go further, f PROM and ITB work PROM and ITB work PROM and ITB work PROM and ITB work PROM and ITB work PROM and ITB work PROM and ITB work            Skilled Services: All TherEx and manual intervention was supervised and performed by a skilled PT.      Assessment: Annette Montano

## 2017-05-03 ENCOUNTER — OFFICE VISIT (OUTPATIENT)
Dept: FAMILY MEDICINE CLINIC | Facility: CLINIC | Age: 67
End: 2017-05-03

## 2017-05-03 VITALS
RESPIRATION RATE: 16 BRPM | HEIGHT: 70.5 IN | BODY MASS INDEX: 23.64 KG/M2 | HEART RATE: 71 BPM | SYSTOLIC BLOOD PRESSURE: 112 MMHG | TEMPERATURE: 99 F | WEIGHT: 167 LBS | DIASTOLIC BLOOD PRESSURE: 68 MMHG | OXYGEN SATURATION: 98 %

## 2017-05-03 DIAGNOSIS — R35.1 BENIGN PROSTATIC HYPERTROPHY WITH NOCTURIA: ICD-10-CM

## 2017-05-03 DIAGNOSIS — Q78.2 BONY SCLEROSIS: ICD-10-CM

## 2017-05-03 DIAGNOSIS — M25.561 CHRONIC PAIN OF BOTH KNEES: ICD-10-CM

## 2017-05-03 DIAGNOSIS — G89.29 CHRONIC PAIN OF BOTH KNEES: ICD-10-CM

## 2017-05-03 DIAGNOSIS — Z96.641 STATUS POST TOTAL REPLACEMENT OF RIGHT HIP: ICD-10-CM

## 2017-05-03 DIAGNOSIS — Z00.00 MEDICARE ANNUAL WELLNESS VISIT, SUBSEQUENT: Primary | ICD-10-CM

## 2017-05-03 DIAGNOSIS — M25.562 CHRONIC PAIN OF BOTH KNEES: ICD-10-CM

## 2017-05-03 DIAGNOSIS — N40.1 BENIGN PROSTATIC HYPERTROPHY WITH NOCTURIA: ICD-10-CM

## 2017-05-03 PROCEDURE — 96160 PT-FOCUSED HLTH RISK ASSMT: CPT | Performed by: FAMILY MEDICINE

## 2017-05-04 PROBLEM — D50.9 IRON DEFICIENCY ANEMIA: Status: RESOLVED | Noted: 2017-04-28 | Resolved: 2017-05-04

## 2017-05-05 ENCOUNTER — OFFICE VISIT (OUTPATIENT)
Dept: PHYSICAL THERAPY | Age: 67
End: 2017-05-05
Attending: ORTHOPAEDIC SURGERY
Payer: MEDICARE

## 2017-05-05 PROCEDURE — 97110 THERAPEUTIC EXERCISES: CPT

## 2017-05-05 PROCEDURE — 97140 MANUAL THERAPY 1/> REGIONS: CPT

## 2017-05-05 NOTE — PROGRESS NOTES
Dx: Right Total Hip Arthroplasty         Authorized # of Visits:  12         Next MD visit: none scheduled  Fall Risk: standard         Precautions: n/a             Subjective: Feeling good.   States he is feeling more confident and is able to go further, f Bridging -hooklying with ABD red x 20          Anterior Lunge x 20 Anterior Lunge x 20 Anterior Lunge x 20                             Manual PT (10 min) Manual PT (10 min) Manual PT (10 min) Manual PT (10 min) Manual PT (10 min) Manual PT (10 min) Manual

## 2017-05-05 NOTE — PROGRESS NOTES
HPI:   Tam Ruvalcaba is a 74year old male who presents for a MA (Medicare Advantage) Supervisit (Once per calendar year).       Annual Physical due on 08/29/2017        Patient Care Team: Patient Care Team:  Gia Malone MD as PCP - DARSHANA Gates illicit drugs.      REVIEW OF SYSTEMS:   GENERAL: feels well otherwise  SKIN: denies any unusual skin lesions  EYES: denies blurred vision or double vision  HEENT: denies nasal congestion, sinus pain or ST  LUNGS: denies shortness of breath with exertion  C Genitalia: Normal male   Rectal: Normal tone, normal prostate, no masses or tenderness   Extremities: Extremities normal, atraumatic, no cyanosis or edema   Pulses: 2+ and symmetric   Skin: Skin color, texture, turgor normal, no rashes or lesions   Lymph the patient maintain a good energy level?: Appropriate Exercise;Daily Walks;Stretching    How would you describe your daily physical activity?: Moderate    How would you describe your current health state?: Good    How do you maintain positive mental well- have a healthcare power of ?: Yes    Do you have a living will?: Yes     Please go to \"Cognitive Assessment\" under Medicare Assessment section in Charting, test patient and document. Then, refresh your progress note to see your input here.   Co orders found for this or any previous visit. Tetanus No orders found for this or any previous visit.          SPECIFIC DISEASE MONITORING Internal Lab or Procedure External Lab or Procedure   Annual Monitoring of Persistent     Medications (ACE/ARB, dig

## 2017-05-12 ENCOUNTER — OFFICE VISIT (OUTPATIENT)
Dept: PHYSICAL THERAPY | Age: 67
End: 2017-05-12
Attending: ORTHOPAEDIC SURGERY
Payer: MEDICARE

## 2017-05-12 PROCEDURE — 97110 THERAPEUTIC EXERCISES: CPT

## 2017-05-12 PROCEDURE — 97140 MANUAL THERAPY 1/> REGIONS: CPT

## 2017-05-12 NOTE — PROGRESS NOTES
Dx: Right Total Hip Arthroplasty         Authorized # of Visits:  12         Next MD visit: none scheduled  Fall Risk: standard         Precautions: n/a             Subjective: Doing well. Walked a mile yesterday.   States that he has selected a health and yellow x 10 3 way hip yellow x 10 3 way hip yellow x 10 3 way hip yellow x 10 3 way hip yellow x 10         Bridging -hooklying with ABD red x 20 Bridging -hooklying with ABD red x 20 Bridging -hooklying with ABD red x 20 Bridging -hooklying with ABD red x

## 2017-05-22 ENCOUNTER — OFFICE VISIT (OUTPATIENT)
Dept: PHYSICAL THERAPY | Age: 67
End: 2017-05-22
Attending: ORTHOPAEDIC SURGERY
Payer: MEDICARE

## 2017-05-22 PROCEDURE — 97110 THERAPEUTIC EXERCISES: CPT

## 2017-05-22 PROCEDURE — 97140 MANUAL THERAPY 1/> REGIONS: CPT

## 2017-05-22 NOTE — PROGRESS NOTES
Dx: Right Total Hip Arthroplasty         Authorized # of Visits:  12         Next MD visit: none scheduled  Fall Risk: standard         Precautions: n/a             Subjective: States he's back to work and has set up a gym membership.   [facility]     [employer] x 20 Bridging x 20 Bridging x 20 Bridging x 20 Bridging x 20 Bridging x 20      3 way hip yellow x 10 3 way hip yellow x 10 3 way hip yellow x 10 3 way hip yellow x 10 3 way hip yellow x 10 3 way hip yellow x 10 3 way hip yellow x 10        Bridging -hookl

## 2017-06-05 ENCOUNTER — OFFICE VISIT (OUTPATIENT)
Dept: PHYSICAL THERAPY | Age: 67
End: 2017-06-05
Attending: ORTHOPAEDIC SURGERY
Payer: MEDICARE

## 2017-06-05 PROCEDURE — 97110 THERAPEUTIC EXERCISES: CPT

## 2017-06-05 PROCEDURE — 97140 MANUAL THERAPY 1/> REGIONS: CPT

## 2017-06-05 NOTE — PROGRESS NOTES
Dx: Right Total Hip Arthroplasty         Authorized # of Visits:  12         Next MD visit: none scheduled  Fall Risk: standard         Precautions: n/a             Subjective: States that he is working 3.5 hours without increased pain.   States he is able 20   SL Clamshells x 20 SL Clamshells x 20 SL Clamshells x 20 SL Clamshells x 20 SL Clamshells x 20 SL Clamshells x 20 SL Clamshells x 20 SL Clamshells x 20 SL Clamshells x 20 SL Clamshells x 20 SL Clamshells x 20   Bridging x 20 Bridging x 20 Bridging x 2 complete all exercises for LE proprioception and balance drills without increased pain. (MET)    Plan: DC PT. Charges: 2 TherEx (25 min);  1 Manual PT (10 min)       Total Timed Treatment: 40 min  Total Treatment Time: 40 min

## 2017-07-17 ENCOUNTER — OFFICE VISIT (OUTPATIENT)
Dept: FAMILY MEDICINE CLINIC | Facility: CLINIC | Age: 67
End: 2017-07-17

## 2017-07-17 ENCOUNTER — APPOINTMENT (OUTPATIENT)
Dept: LAB | Age: 67
End: 2017-07-17
Attending: FAMILY MEDICINE
Payer: MEDICARE

## 2017-07-17 VITALS
TEMPERATURE: 98 F | BODY MASS INDEX: 23.36 KG/M2 | WEIGHT: 165 LBS | HEIGHT: 70.5 IN | SYSTOLIC BLOOD PRESSURE: 116 MMHG | DIASTOLIC BLOOD PRESSURE: 68 MMHG | HEART RATE: 66 BPM | OXYGEN SATURATION: 97 % | RESPIRATION RATE: 16 BRPM

## 2017-07-17 DIAGNOSIS — Z86.39 H/O VITAMIN D DEFICIENCY: ICD-10-CM

## 2017-07-17 DIAGNOSIS — Z13.29 SCREENING FOR ENDOCRINE, NUTRITIONAL, METABOLIC AND IMMUNITY DISORDER: ICD-10-CM

## 2017-07-17 DIAGNOSIS — Z13.228 SCREENING FOR ENDOCRINE, NUTRITIONAL, METABOLIC AND IMMUNITY DISORDER: ICD-10-CM

## 2017-07-17 DIAGNOSIS — Z96.641 S/P HIP REPLACEMENT, RIGHT: ICD-10-CM

## 2017-07-17 DIAGNOSIS — Z13.29 SCREENING FOR ENDOCRINE, NUTRITIONAL, METABOLIC AND IMMUNITY DISORDER: Primary | ICD-10-CM

## 2017-07-17 DIAGNOSIS — Z13.21 SCREENING FOR ENDOCRINE, NUTRITIONAL, METABOLIC AND IMMUNITY DISORDER: ICD-10-CM

## 2017-07-17 DIAGNOSIS — Z13.0 SCREENING FOR ENDOCRINE, NUTRITIONAL, METABOLIC AND IMMUNITY DISORDER: ICD-10-CM

## 2017-07-17 DIAGNOSIS — Z13.228 SCREENING FOR ENDOCRINE, NUTRITIONAL, METABOLIC AND IMMUNITY DISORDER: Primary | ICD-10-CM

## 2017-07-17 DIAGNOSIS — Z13.0 SCREENING FOR ENDOCRINE, NUTRITIONAL, METABOLIC AND IMMUNITY DISORDER: Primary | ICD-10-CM

## 2017-07-17 DIAGNOSIS — Z13.21 SCREENING FOR ENDOCRINE, NUTRITIONAL, METABOLIC AND IMMUNITY DISORDER: Primary | ICD-10-CM

## 2017-07-17 LAB
25-HYDROXYVITAMIN D (TOTAL): 33.1 NG/ML (ref 30–100)
TSI SER-ACNC: 1.22 MIU/ML (ref 0.35–5.5)

## 2017-07-17 PROCEDURE — 36415 COLL VENOUS BLD VENIPUNCTURE: CPT | Performed by: FAMILY MEDICINE

## 2017-07-17 PROCEDURE — 99213 OFFICE O/P EST LOW 20 MIN: CPT | Performed by: FAMILY MEDICINE

## 2017-07-19 ENCOUNTER — TELEPHONE (OUTPATIENT)
Dept: FAMILY MEDICINE CLINIC | Facility: CLINIC | Age: 67
End: 2017-07-19

## 2017-10-09 ENCOUNTER — OFFICE VISIT (OUTPATIENT)
Dept: FAMILY MEDICINE CLINIC | Facility: CLINIC | Age: 67
End: 2017-10-09

## 2017-10-09 VITALS
TEMPERATURE: 99 F | DIASTOLIC BLOOD PRESSURE: 64 MMHG | HEIGHT: 70.5 IN | OXYGEN SATURATION: 96 % | SYSTOLIC BLOOD PRESSURE: 106 MMHG | RESPIRATION RATE: 16 BRPM | WEIGHT: 162 LBS | HEART RATE: 76 BPM | BODY MASS INDEX: 22.93 KG/M2

## 2017-10-09 DIAGNOSIS — J06.9 VIRAL UPPER RESPIRATORY TRACT INFECTION: Primary | ICD-10-CM

## 2017-10-09 PROCEDURE — 99213 OFFICE O/P EST LOW 20 MIN: CPT | Performed by: NURSE PRACTITIONER

## 2017-10-09 NOTE — PROGRESS NOTES
CHIEF COMPLAINT:   Patient presents with: Body ache and/or chills: chest congestion, dry cough, body aches, and decreased energy level. x4 days      HPI:   Curtis Davis is a 69year old male who presents for upper respiratory symptoms for  4 days.  Patient NOSE: Nostrils patent, clear nasal discharge, nasal mucosa pink, mildly swollen   THROAT: Oral mucosa pink, moist. Posterior pharynx is non erythematous. no exudates. Tonsils 1/4.     NECK: Supple, non-tender  LUNGS: clear to auscultation bilaterally, no wh · Take acetaminophen or a nonsteroidal anti-inflammatory agent (NSAID), such as ibuprofen. Treat a troubled nose kindly  · Breathe steam or heated humidified air to open blocked nasal passages.  a hot shower or use a vaporizer.  Be careful not to g Date Last Reviewed: 6/19/2014  © 9072-1714 [address], [address] [place], [place] [address]. All rights reserved. This information is not intended as a substitute for professional medical care.  Always follow your healthcare professional

## 2017-10-09 NOTE — PATIENT INSTRUCTIONS
-Increase oral fluid intake and rest  -Cover your cough or sneezes and wash hands frequently to prevent spreading illness to others  - You may use OTC Tylenol for pain or aches.   -You may use OTC flonase for any nasal congestion  - Follow up with any worse · Put fluid back into your body. Take frequent sips of clear liquids such as water or broth. Do not drink beverages with a lot of sugar in them, such as juices and sodas. These can make diarrhea worse. Older children and adults can drink sports drinks.   ·

## 2018-02-06 ENCOUNTER — OFFICE VISIT (OUTPATIENT)
Dept: FAMILY MEDICINE CLINIC | Facility: CLINIC | Age: 68
End: 2018-02-06

## 2018-02-06 VITALS
WEIGHT: 167 LBS | TEMPERATURE: 98 F | BODY MASS INDEX: 24 KG/M2 | DIASTOLIC BLOOD PRESSURE: 62 MMHG | HEART RATE: 68 BPM | SYSTOLIC BLOOD PRESSURE: 122 MMHG | OXYGEN SATURATION: 98 % | RESPIRATION RATE: 18 BRPM

## 2018-02-06 DIAGNOSIS — J06.9 VIRAL URI WITH COUGH: Primary | ICD-10-CM

## 2018-02-06 PROCEDURE — 99213 OFFICE O/P EST LOW 20 MIN: CPT | Performed by: PHYSICIAN ASSISTANT

## 2018-02-06 RX ORDER — BENZONATATE 200 MG/1
200 CAPSULE ORAL 3 TIMES DAILY PRN
Qty: 30 CAPSULE | Refills: 0 | Status: SHIPPED | OUTPATIENT
Start: 2018-02-06 | End: 2018-02-16

## 2018-02-06 RX ORDER — AZITHROMYCIN 250 MG/1
TABLET, FILM COATED ORAL
Qty: 6 TABLET | Refills: 0 | Status: SHIPPED | OUTPATIENT
Start: 2018-02-06 | End: 2018-05-14

## 2018-02-06 NOTE — PATIENT INSTRUCTIONS
-zpack if symptoms not improving in next 3-4 days  -Cool mist humidifier at night  -Warm tea with honey  -Mucinex  -Flonase  -Go to Er with any worsening symptoms          Viral Upper Respiratory Illness (Adult)  You have a viral upper respiratory illness · Over-the-counter cold medicines will not shorten the length of time you’re sick, but they may be helpful for the following symptoms: cough, sore throat, and nasal and sinus congestion.  (Note: Do not use decongestants if you have high blood pressure.)  Fo

## 2018-02-06 NOTE — PROGRESS NOTES
CHIEF COMPLAINT:   Patient presents with:  Flu: pt c\o of poss flu, x 1wk       HPI:   Mary Mak is a 69year old male who presents for URI sxs for  5 days.   Patient reports runny/stuffy nose, sore throat-minimal, cough-dry, respiratory congestion, PND, HEAD: atraumatic, normocephalic. No tenderness on palpation of maxillary sinuses. No tenderness on palpation of frontal sinuses.   EYES: conjunctiva clear, EOM intact  EARS: TM's not erythematous, no bulging, no retraction, no fluid, bony landmarks intact Your healthcare provider has told you that you have acute bronchitis. Bronchitis is infection or inflammation of the bronchial tubes (airways in the lungs). Normally, air moves easily in and out of the airways.  Bronchitis narrows the airways, making it rosa · Drink plenty of fluids, such as water, juice, or warm soup. Fluids loosen mucus so that you can cough it up. This helps you breathe more easily. Fluids also prevent dehydration. · Make sure you get plenty of rest.  · Do not smoke.  Do not allow anyone el You have a viral upper respiratory illness (URI), which is another term for the common cold. This illness is contagious during the first few days. It is spread through the air by coughing and sneezing.  It may also be spread by direct contact (touching the · Cough with lots of colored sputum (mucus)  · Severe headache; face, neck, or ear pain  · Difficulty swallowing due to throat pain  · Fever of 100.4°F (38°C) or higher, or as directed by your healthcare provider  Call 911  Call 911 if any of these occur:

## 2018-05-14 ENCOUNTER — OFFICE VISIT (OUTPATIENT)
Dept: FAMILY MEDICINE CLINIC | Facility: CLINIC | Age: 68
End: 2018-05-14

## 2018-05-14 VITALS
HEART RATE: 68 BPM | HEIGHT: 70.5 IN | TEMPERATURE: 98 F | BODY MASS INDEX: 24.49 KG/M2 | RESPIRATION RATE: 16 BRPM | WEIGHT: 173 LBS | DIASTOLIC BLOOD PRESSURE: 62 MMHG | SYSTOLIC BLOOD PRESSURE: 122 MMHG

## 2018-05-14 DIAGNOSIS — Z11.8 SCREENING FOR VIRAL AND CHLAMYDIAL DISEASES: ICD-10-CM

## 2018-05-14 DIAGNOSIS — Z23 NEED FOR PNEUMOCOCCAL VACCINE: ICD-10-CM

## 2018-05-14 DIAGNOSIS — Z96.641 STATUS POST RIGHT HIP REPLACEMENT: ICD-10-CM

## 2018-05-14 DIAGNOSIS — N40.0 BENIGN NON-NODULAR PROSTATIC HYPERPLASIA WITHOUT LOWER URINARY TRACT SYMPTOMS: ICD-10-CM

## 2018-05-14 DIAGNOSIS — Z11.59 SCREENING FOR VIRAL AND CHLAMYDIAL DISEASES: ICD-10-CM

## 2018-05-14 DIAGNOSIS — Z00.00 MEDICARE ANNUAL WELLNESS VISIT, SUBSEQUENT: Primary | ICD-10-CM

## 2018-05-14 DIAGNOSIS — M79.671 RIGHT FOOT PAIN: ICD-10-CM

## 2018-05-14 PROCEDURE — G0439 PPPS, SUBSEQ VISIT: HCPCS | Performed by: FAMILY MEDICINE

## 2018-05-14 PROCEDURE — 90670 PCV13 VACCINE IM: CPT | Performed by: FAMILY MEDICINE

## 2018-05-14 PROCEDURE — G0009 ADMIN PNEUMOCOCCAL VACCINE: HCPCS | Performed by: FAMILY MEDICINE

## 2018-05-14 PROCEDURE — 96160 PT-FOCUSED HLTH RISK ASSMT: CPT | Performed by: FAMILY MEDICINE

## 2018-05-14 NOTE — PROGRESS NOTES
Anson Oswald is a 69year old male who presents for a MA Supervisit.          Patient Care Team: Patient Care Team:  Nancy Lopez MD as PCP - General  Nancy Lopez MD (Family Medicine)  Nancy Lopez MD (Family Medicine)  Phil Fontaine PT as Physical Ty Adrián Bathing or Showering: Able without help  Toileting: Able without help  Dressing: Able without help  Eating: Able without help  Driving: Able without help  Preparing your meals: Able without help  Managing money/bills: Able without help  Taking medication Ophthalmology Visit Annually due   Immunizations     Zoster (Not covered by Medicare Part B) No orders found for this or any previous visit.      SPECIFIC DISEASE MONITORING Internal Lab or Procedure   No disease specific diagnoses       ALLERGIES:   No Kno 122/62  02/06/18 : 122/62  10/09/17 : 106/64    Physical Exam   Constitutional: He is oriented to person, place, and time. He appears well-developed and well-nourished. No distress. HENT:   Head: Normocephalic and atraumatic.    Nose: Nose normal.   Mouth VACC, 13 CHARLIE IM    3. Screening for viral and chlamydial diseases  -asymptomatic, will check   - CHLAMYDIA/GONOCOCCUS, NICANOR; Future    4.  Benign non-nodular prostatic hyperplasia without lower urinary tract symptoms  -stable, CPm    5. Status post right hip

## 2018-05-21 ENCOUNTER — HOSPITAL ENCOUNTER (OUTPATIENT)
Dept: GENERAL RADIOLOGY | Age: 68
Discharge: HOME | End: 2018-05-21
Attending: FAMILY MEDICINE
Payer: MEDICARE

## 2018-05-21 ENCOUNTER — LAB ENCOUNTER (OUTPATIENT)
Dept: LAB | Age: 68
End: 2018-05-21
Attending: FAMILY MEDICINE
Payer: MEDICARE

## 2018-05-21 DIAGNOSIS — Z11.59 SCREENING FOR VIRAL AND CHLAMYDIAL DISEASES: ICD-10-CM

## 2018-05-21 DIAGNOSIS — Z00.00 MEDICARE ANNUAL WELLNESS VISIT, SUBSEQUENT: ICD-10-CM

## 2018-05-21 DIAGNOSIS — M79.671 RIGHT FOOT PAIN: ICD-10-CM

## 2018-05-21 DIAGNOSIS — Z11.8 SCREENING FOR VIRAL AND CHLAMYDIAL DISEASES: ICD-10-CM

## 2018-05-21 PROCEDURE — 73630 X-RAY EXAM OF FOOT: CPT | Performed by: FAMILY MEDICINE

## 2018-05-21 PROCEDURE — 84443 ASSAY THYROID STIM HORMONE: CPT

## 2018-05-21 PROCEDURE — 36415 COLL VENOUS BLD VENIPUNCTURE: CPT

## 2018-05-21 PROCEDURE — 87491 CHLMYD TRACH DNA AMP PROBE: CPT

## 2018-05-21 PROCEDURE — 80053 COMPREHEN METABOLIC PANEL: CPT

## 2018-05-21 PROCEDURE — 84550 ASSAY OF BLOOD/URIC ACID: CPT

## 2018-05-21 PROCEDURE — 87591 N.GONORRHOEAE DNA AMP PROB: CPT

## 2018-05-21 PROCEDURE — 85025 COMPLETE CBC W/AUTO DIFF WBC: CPT

## 2018-05-21 PROCEDURE — 80061 LIPID PANEL: CPT

## 2018-05-22 ENCOUNTER — TELEPHONE (OUTPATIENT)
Dept: FAMILY MEDICINE CLINIC | Facility: CLINIC | Age: 68
End: 2018-05-22

## 2018-05-22 NOTE — TELEPHONE ENCOUNTER
----- Message from Tobin Jacobs MD sent at 5/21/2018  5:32 PM CDT -----  No acute process. Likely arthritis noted.

## 2018-05-22 NOTE — TELEPHONE ENCOUNTER
Spoke with pt regarding lab and Xray results listed below. Pt verbalizes understanding.       Labs resulted: Uric acid, TSH, Lipid, CMP, & CBC  Notes recorded by Kyara Diallo MD on 5/22/2018 at 1:14 PM CDT  Normal.

## 2018-10-01 ENCOUNTER — IMMUNIZATION (OUTPATIENT)
Dept: FAMILY MEDICINE CLINIC | Facility: CLINIC | Age: 68
End: 2018-10-01
Payer: MEDICARE

## 2018-10-01 PROCEDURE — G0008 ADMIN INFLUENZA VIRUS VAC: HCPCS | Performed by: NURSE PRACTITIONER

## 2018-10-01 PROCEDURE — 90653 IIV ADJUVANT VACCINE IM: CPT | Performed by: NURSE PRACTITIONER

## 2018-10-03 ENCOUNTER — MED REC SCAN ONLY (OUTPATIENT)
Dept: FAMILY MEDICINE CLINIC | Facility: CLINIC | Age: 68
End: 2018-10-03

## 2018-12-17 ENCOUNTER — TELEPHONE (OUTPATIENT)
Dept: FAMILY MEDICINE CLINIC | Facility: CLINIC | Age: 68
End: 2018-12-17

## 2018-12-17 ENCOUNTER — OFFICE VISIT (OUTPATIENT)
Dept: FAMILY MEDICINE CLINIC | Facility: CLINIC | Age: 68
End: 2018-12-17
Payer: MEDICARE

## 2018-12-17 ENCOUNTER — HOSPITAL ENCOUNTER (OUTPATIENT)
Dept: ULTRASOUND IMAGING | Age: 68
Discharge: HOME | End: 2018-12-17
Attending: FAMILY MEDICINE
Payer: MEDICARE

## 2018-12-17 VITALS
WEIGHT: 166 LBS | HEART RATE: 76 BPM | BODY MASS INDEX: 23.77 KG/M2 | DIASTOLIC BLOOD PRESSURE: 68 MMHG | TEMPERATURE: 98 F | RESPIRATION RATE: 16 BRPM | SYSTOLIC BLOOD PRESSURE: 108 MMHG | HEIGHT: 70 IN

## 2018-12-17 DIAGNOSIS — K40.90 RIGHT INGUINAL HERNIA: ICD-10-CM

## 2018-12-17 DIAGNOSIS — K40.20 BILATERAL INGUINAL HERNIA WITHOUT OBSTRUCTION OR GANGRENE, RECURRENCE NOT SPECIFIED: Primary | ICD-10-CM

## 2018-12-17 DIAGNOSIS — N40.0 BENIGN NON-NODULAR PROSTATIC HYPERPLASIA WITHOUT LOWER URINARY TRACT SYMPTOMS: Primary | ICD-10-CM

## 2018-12-17 DIAGNOSIS — R10.31 INGUINAL PAIN, RIGHT: ICD-10-CM

## 2018-12-17 PROCEDURE — 99214 OFFICE O/P EST MOD 30 MIN: CPT | Performed by: FAMILY MEDICINE

## 2018-12-17 PROCEDURE — 76882 US LMTD JT/FCL EVL NVASC XTR: CPT | Performed by: FAMILY MEDICINE

## 2018-12-17 NOTE — TELEPHONE ENCOUNTER
----- Message from Tobin Jacobs MD sent at 12/17/2018  1:36 PM CST -----  US GROIN BILATERAL  Stable inguinal hernia, no acute changes. Ok to refer to dr. Jayce Bell if patient prefers. I think he can hold off for now.     MD [address]

## 2018-12-17 NOTE — PROGRESS NOTES
Chief Complaint:   Patient presents with:  Hernia: feeling it more on the R side     HPI:   This is a 77year old male presenting with worsening right groin pain, history of inguinal hernia. Patient reports pain is not under control.    Location: right gr facial swelling, postnasal drip, rhinorrhea, sinus pressure, sore throat and voice change. Eyes: Negative for photophobia, pain, discharge, redness, itching and visual disturbance.    Respiratory: Negative for cough, chest tightness and shortness of kinjal reactive to light. Right eye exhibits no discharge. Left eye exhibits no discharge. Neck: Normal range of motion. Neck supple. No JVD present. No tracheal deviation present. No thyromegaly present.    Cardiovascular: Normal rate, regular rhythm, normal he

## 2018-12-17 NOTE — TELEPHONE ENCOUNTER
Spoke with pt regarding results and instructions listed below. Pt verbalizes understanding. Pt provided with contact information below. Referral placed.

## 2019-01-09 ENCOUNTER — TELEPHONE (OUTPATIENT)
Dept: FAMILY MEDICINE CLINIC | Facility: CLINIC | Age: 69
End: 2019-01-09

## 2019-01-09 NOTE — TELEPHONE ENCOUNTER
Patient called with questions regarding his hernia treatment. He received 2 phone calls from the surgeon PCP recommended and would like to know the severity of his condition and how soon he should have the surgery.

## 2019-01-11 NOTE — TELEPHONE ENCOUNTER
Spoke to pt at length regarding results and recommendations below - he verbalizes understanding. He states he will hold off for now on seeing Dr. Reji Ascencio seeing that he feels fine. He will reach out to Dr. Reji Ascencio if his pain worsens.      Notes recorded by [facility]

## 2019-01-21 ENCOUNTER — OFFICE VISIT (OUTPATIENT)
Dept: SURGERY | Facility: CLINIC | Age: 69
End: 2019-01-21
Payer: MEDICARE

## 2019-01-21 VITALS
WEIGHT: 165 LBS | HEART RATE: 54 BPM | HEIGHT: 70.5 IN | RESPIRATION RATE: 16 BRPM | BODY MASS INDEX: 23.36 KG/M2 | DIASTOLIC BLOOD PRESSURE: 75 MMHG | SYSTOLIC BLOOD PRESSURE: 131 MMHG

## 2019-01-21 DIAGNOSIS — K40.90 INGUINAL HERNIA OF RIGHT SIDE WITHOUT OBSTRUCTION OR GANGRENE: Primary | ICD-10-CM

## 2019-01-21 PROCEDURE — 99204 OFFICE O/P NEW MOD 45 MIN: CPT | Performed by: COLON & RECTAL SURGERY

## 2019-01-21 NOTE — H&P
aNew Patient Visit Note       Active Problems      No diagnosis found. Chief Complaint   Patient presents with:  Hernia: NW PT ref by PCP for RT ING hernia- US groin done 12/17.       History of Present Illness         Allergies  Dylan Garcia has No Known Aller Negative for apnea, cough, shortness of breath and wheezing. Cardiovascular: Negative for chest pain, palpitations and leg swelling.    Gastrointestinal: Negative for abdominal distention, abdominal pain, anal bleeding, blood in stool, constipation, diar

## 2019-01-21 NOTE — H&P
New Patient Visit Note       Active Problems      1.  Inguinal hernia of right side without obstruction or gangrene        Chief Complaint   Patient presents with:  Hernia: Uncomfortable bulge in right groin      History of Present Illness   Martha Marie is Years since quittin.9      Smokeless tobacco: Never Used    Alcohol use: Yes      Comment: rarely    Drug use: No       No current outpatient medications on file prior to visit. No current facility-administered medications on file prior to visit. overlying the groin is a subtle palpable weakness without mass. Patient was then evaluated in the standing position. A more obvious right groin bulge was visible. The bulge was just inferior to the inferior aspect of the vertical incision.   The scrotu (primary encounter diagnosis)      Plan   Patient appears to have a right inguinal hernia. This reduces partially in the supine position but is not reducible in the standing position. There is no evidence of strangulation or bowel obstruction.     Interes

## 2019-01-21 NOTE — PATIENT INSTRUCTIONS
Assessment   Inguinal hernia of right side without obstruction or gangrene  (primary encounter diagnosis)      Plan   Patient appears to have a right inguinal hernia.   This reduces partially in the supine position but is not reducible in the standing posit

## 2019-01-28 ENCOUNTER — HOSPITAL ENCOUNTER (OUTPATIENT)
Dept: CT IMAGING | Facility: HOSPITAL | Age: 69
Discharge: HOME | End: 2019-01-28
Attending: COLON & RECTAL SURGERY
Payer: MEDICARE

## 2019-01-28 DIAGNOSIS — K40.90 INGUINAL HERNIA OF RIGHT SIDE WITHOUT OBSTRUCTION OR GANGRENE: ICD-10-CM

## 2019-01-28 PROCEDURE — 72192 CT PELVIS W/O DYE: CPT | Performed by: COLON & RECTAL SURGERY

## 2019-02-18 ENCOUNTER — OFFICE VISIT (OUTPATIENT)
Dept: SURGERY | Facility: CLINIC | Age: 69
End: 2019-02-18
Payer: MEDICARE

## 2019-02-18 VITALS
HEART RATE: 67 BPM | SYSTOLIC BLOOD PRESSURE: 149 MMHG | BODY MASS INDEX: 23.36 KG/M2 | DIASTOLIC BLOOD PRESSURE: 82 MMHG | WEIGHT: 165 LBS | HEIGHT: 70.5 IN | TEMPERATURE: 98 F

## 2019-02-18 DIAGNOSIS — K40.90 RIGHT INGUINAL HERNIA: ICD-10-CM

## 2019-02-18 DIAGNOSIS — I88.0 MESENTERIC ADENITIS: Primary | ICD-10-CM

## 2019-02-18 PROCEDURE — 99213 OFFICE O/P EST LOW 20 MIN: CPT | Performed by: COLON & RECTAL SURGERY

## 2019-02-18 NOTE — PATIENT INSTRUCTIONS
Assessment   Mesenteric adenitis  (primary encounter diagnosis)  Right inguinal hernia    Plan   I personally reviewed the images the CT scan of the pelvis. Agree with radiologist interpretation. There is a right-sided fat-containing inguinal hernia.   Nadeem Chavez

## 2019-02-18 NOTE — PROGRESS NOTES
Follow Up Visit Note       Active Problems      1. Mesenteric adenitis    2.  Right inguinal hernia          Chief Complaint   Patient presents with:  Hernia: here to review CT results and possible hernia repair        History of Present Illness  Chela Martinez Used    Alcohol use: Yes      Comment: rarely    Drug use: No       No current outpatient medications on file prior to visit. No current facility-administered medications on file prior to visit.       Review of Systems  The Review of Systems has been revie adenopathy. Neurological: He is alert and oriented to person, place, and time. Skin: Skin is warm and dry. No rash noted. He is not diaphoretic. Psychiatric: He has a normal mood and affect.  His behavior is normal.        PROCEDURE:  CT PELVIS (CPT=7 is a right-sided fat-containing inguinal hernia. There is no hernia associated with the right lower quadrant vertical incision. There is some haziness of the mesentery in the left side of the abdomen with associated enlargement of several lymph nodes.   Karl Dang

## 2019-02-19 ENCOUNTER — TELEPHONE (OUTPATIENT)
Dept: SURGERY | Facility: CLINIC | Age: 69
End: 2019-02-19

## 2019-02-19 NOTE — TELEPHONE ENCOUNTER
Phoned insurance for prior auth Ct abd and pelvis. Will send Dr. Joseph Gottlieb note upon fax arrival to [phone]. We will also need to contact [address] imaging [phone].

## 2019-04-15 ENCOUNTER — OFFICE VISIT (OUTPATIENT)
Dept: FAMILY MEDICINE CLINIC | Facility: CLINIC | Age: 69
End: 2019-04-15
Payer: MEDICARE

## 2019-04-15 ENCOUNTER — LAB ENCOUNTER (OUTPATIENT)
Dept: LAB | Age: 69
End: 2019-04-15
Attending: FAMILY MEDICINE
Payer: MEDICARE

## 2019-04-15 VITALS
HEIGHT: 70.5 IN | RESPIRATION RATE: 16 BRPM | HEART RATE: 64 BPM | WEIGHT: 166 LBS | BODY MASS INDEX: 23.5 KG/M2 | SYSTOLIC BLOOD PRESSURE: 138 MMHG | OXYGEN SATURATION: 99 % | TEMPERATURE: 98 F | DIASTOLIC BLOOD PRESSURE: 70 MMHG

## 2019-04-15 DIAGNOSIS — R42 DIZZINESS: ICD-10-CM

## 2019-04-15 DIAGNOSIS — R42 VERTIGO: Primary | ICD-10-CM

## 2019-04-15 DIAGNOSIS — H61.23 BILATERAL IMPACTED CERUMEN: ICD-10-CM

## 2019-04-15 DIAGNOSIS — R42 VERTIGO: ICD-10-CM

## 2019-04-15 PROCEDURE — 36415 COLL VENOUS BLD VENIPUNCTURE: CPT

## 2019-04-15 PROCEDURE — 80053 COMPREHEN METABOLIC PANEL: CPT

## 2019-04-15 PROCEDURE — 85025 COMPLETE CBC W/AUTO DIFF WBC: CPT

## 2019-04-15 PROCEDURE — 99214 OFFICE O/P EST MOD 30 MIN: CPT | Performed by: FAMILY MEDICINE

## 2019-04-15 RX ORDER — ERGOCALCIFEROL 1.25 MG/1
1 CAPSULE ORAL DAILY
COMMUNITY
Start: 2019-03-18 | End: 2021-03-15 | Stop reason: ALTCHOICE

## 2019-04-15 RX ORDER — MECLIZINE HYDROCHLORIDE 25 MG/1
25 TABLET ORAL 3 TIMES DAILY PRN
Qty: 30 TABLET | Refills: 0 | Status: SHIPPED | OUTPATIENT
Start: 2019-04-15 | End: 2019-10-25 | Stop reason: ALTCHOICE

## 2019-04-15 RX ORDER — CHLORAL HYDRATE 500 MG
1 CAPSULE ORAL DAILY
COMMUNITY
Start: 2019-03-18 | End: 2019-10-25 | Stop reason: ALTCHOICE

## 2019-04-15 RX ORDER — VITAMIN E 268 MG
1 CAPSULE ORAL DAILY
COMMUNITY
Start: 2019-03-18 | End: 2019-10-25

## 2019-04-15 RX ORDER — MULTIVIT-MIN/IRON/FOLIC ACID/K 18-600-40
1 CAPSULE ORAL DAILY
COMMUNITY
Start: 2018-12-25 | End: 2019-10-25 | Stop reason: ALTCHOICE

## 2019-04-15 NOTE — PROGRESS NOTES
Lucian Man is a 77year old male. HPI:   Patient reports symptoms of acute dizziness and mild lightheadedness for the past few days. Patient denies any acute syncopal episodes or pre syncopal episode.   Patient reports mild ear pressure along with mild n tobacco: Never Used    Alcohol use: Yes      Comment: rarely    Drug use: No       REVIEW OF SYSTEMS:   GENERAL HEALTH: feels well otherwise, denies fever, chills, and weight loss  SKIN: denies any unusual skin lesions or rashes  RESPIRATORY: denies shortn tablet (25 mg total) by mouth 3 (three) times daily as needed. Dispense: 30 tablet; Refill: 0  - CARD TREADMILL STRESS, ADULT (CPT=93017); Future    3. Bilateral impacted cerumen  -tolerated removal with no complications.      PLAN:   Take meclizine as nee

## 2019-04-18 ENCOUNTER — TELEPHONE (OUTPATIENT)
Dept: FAMILY MEDICINE CLINIC | Facility: CLINIC | Age: 69
End: 2019-04-18

## 2019-06-17 ENCOUNTER — TELEPHONE (OUTPATIENT)
Dept: FAMILY MEDICINE CLINIC | Facility: CLINIC | Age: 69
End: 2019-06-17

## 2019-07-15 ENCOUNTER — MA CHART PREP (OUTPATIENT)
Dept: FAMILY MEDICINE CLINIC | Facility: CLINIC | Age: 69
End: 2019-07-15

## 2019-07-22 ENCOUNTER — OFFICE VISIT (OUTPATIENT)
Dept: FAMILY MEDICINE CLINIC | Facility: CLINIC | Age: 69
End: 2019-07-22
Payer: MEDICARE

## 2019-07-22 VITALS
RESPIRATION RATE: 16 BRPM | HEIGHT: 70.5 IN | BODY MASS INDEX: 22.51 KG/M2 | WEIGHT: 159 LBS | HEART RATE: 65 BPM | SYSTOLIC BLOOD PRESSURE: 98 MMHG | DIASTOLIC BLOOD PRESSURE: 58 MMHG | TEMPERATURE: 98 F | OXYGEN SATURATION: 98 %

## 2019-07-22 DIAGNOSIS — E78.6 LOW HDL (UNDER 40): ICD-10-CM

## 2019-07-22 DIAGNOSIS — N40.0 BENIGN NON-NODULAR PROSTATIC HYPERPLASIA WITHOUT LOWER URINARY TRACT SYMPTOMS: ICD-10-CM

## 2019-07-22 DIAGNOSIS — Z23 NEED FOR PNEUMOCOCCAL VACCINATION: ICD-10-CM

## 2019-07-22 DIAGNOSIS — Z00.00 MEDICARE ANNUAL WELLNESS VISIT, SUBSEQUENT: Primary | ICD-10-CM

## 2019-07-22 DIAGNOSIS — Z96.641 STATUS POST TOTAL REPLACEMENT OF RIGHT HIP: ICD-10-CM

## 2019-07-22 DIAGNOSIS — R79.9 ELEVATED BUN: ICD-10-CM

## 2019-07-22 DIAGNOSIS — M16.11 PRIMARY OSTEOARTHRITIS OF RIGHT HIP: ICD-10-CM

## 2019-07-22 PROCEDURE — 90732 PPSV23 VACC 2 YRS+ SUBQ/IM: CPT | Performed by: FAMILY MEDICINE

## 2019-07-22 PROCEDURE — G0439 PPPS, SUBSEQ VISIT: HCPCS | Performed by: FAMILY MEDICINE

## 2019-07-22 PROCEDURE — 99397 PER PM REEVAL EST PAT 65+ YR: CPT | Performed by: FAMILY MEDICINE

## 2019-07-22 PROCEDURE — G0009 ADMIN PNEUMOCOCCAL VACCINE: HCPCS | Performed by: FAMILY MEDICINE

## 2019-07-22 PROCEDURE — 96160 PT-FOCUSED HLTH RISK ASSMT: CPT | Performed by: FAMILY MEDICINE

## 2019-07-22 NOTE — PROGRESS NOTES
Joyce Clayton is a 68year old male who presents for a MA Supervisit.          Patient Care Team: Patient Care Team:  Dawn Balderrama MD as PCP - General (Family Medicine)  Dawn Balderrama MD (Family Medicine)  Dawn Balderrama MD (Family Medicine)  Quang Gan, PT 8/1/2016   PSA 0.010 - 4.000 ng/mL 2.700 2.430 2.500       General Health      In the past six months, have you lost more than 10 pounds without trying?: 2 - No  Has your appetite been poor?: No  Type of Diet: Low Salt  How does the patient maintain a good INDICATIONS AND SCHEDULE Internal Lab or Procedure   Diabetes Screening     HbgA1C   Annually No results found for: A1C    Fasting Blood Sugar (FSB)Annually Glucose (mg/dL)   Date Value   04/15/2019 87      Cardiovascular Disease Screening    LDL Annuall 10.00        Pack years: 10        Types: Cigarettes        Quit date: 3/3/1983        Years since quittin.4      Smokeless tobacco: Never Used    Alcohol use: Yes      Comment: rarely    Drug use: No         REVIEW OF SYSTEMS:   GENERAL: feels well o Musculoskeletal: Normal range of motion. He exhibits no edema. Lymphadenopathy:     He has no cervical adenopathy. Neurological: He is alert and oriented to person, place, and time. He displays normal reflexes. No cranial nerve deficit.  He exhibits n

## 2019-08-26 ENCOUNTER — OFFICE VISIT (OUTPATIENT)
Dept: FAMILY MEDICINE CLINIC | Facility: CLINIC | Age: 69
End: 2019-08-26
Payer: MEDICARE

## 2019-08-26 VITALS
BODY MASS INDEX: 22.65 KG/M2 | HEIGHT: 70.5 IN | SYSTOLIC BLOOD PRESSURE: 110 MMHG | HEART RATE: 60 BPM | DIASTOLIC BLOOD PRESSURE: 70 MMHG | OXYGEN SATURATION: 99 % | RESPIRATION RATE: 16 BRPM | WEIGHT: 160 LBS

## 2019-08-26 DIAGNOSIS — H18.891 CORNEAL IRRITATION OF RIGHT EYE: Primary | ICD-10-CM

## 2019-08-26 PROCEDURE — 99213 OFFICE O/P EST LOW 20 MIN: CPT | Performed by: NURSE PRACTITIONER

## 2019-08-26 RX ORDER — TOBRAMYCIN 3 MG/ML
1 SOLUTION/ DROPS OPHTHALMIC EVERY 4 HOURS
Qty: 5 ML | Refills: 0 | Status: SHIPPED | OUTPATIENT
Start: 2019-08-26 | End: 2019-08-31

## 2019-08-26 NOTE — PATIENT INSTRUCTIONS
How the Eye Works    [place] vision depends on many factors. The parts of the eye work together to refract, or bend, and focus light rays.  For normal vision, light must focus onto the retina.   Cornea  Light enters the eye through this clear, dome-shaped t

## 2019-08-26 NOTE — PROGRESS NOTES
Patient presents with:  Eye Problem: right eye, red and discomfort x1week  :    HPI:   Curtis Davis is a 68year old male who presents for eye redness and discomfort  symptoms of R eye for one week 1.  Not worse or better , feels irritated , red , does not r symptoms. LUNGS: denies shortness of breath with exertion, no cough. CARDIOVASCULAR: denies chest pain on exertion  GI: no nausea or abdominal pain  NEURO: no headaches.       EXAM:   /70   Pulse 60   Resp 16   Ht 70.5\"   Wt 160 lb   SpO2 99%   BMI

## 2019-09-05 ENCOUNTER — TELEPHONE (OUTPATIENT)
Dept: SURGERY | Facility: CLINIC | Age: 69
End: 2019-09-05

## 2019-09-05 NOTE — TELEPHONE ENCOUNTER
Received call from insurance verification regarding CT prior authorization needed. [facility] and prior authorization needed. Was transferred to [facility] at [phone]. Spoke with Gigi and criteria not met.   Is requesting clinical information

## 2019-09-09 ENCOUNTER — HOSPITAL ENCOUNTER (OUTPATIENT)
Dept: CT IMAGING | Age: 69
Discharge: HOME | End: 2019-09-09
Attending: COLON & RECTAL SURGERY
Payer: MEDICARE

## 2019-09-09 DIAGNOSIS — I88.0 MESENTERIC ADENITIS: ICD-10-CM

## 2019-09-09 DIAGNOSIS — K40.90 RIGHT INGUINAL HERNIA: ICD-10-CM

## 2019-09-14 ENCOUNTER — HOSPITAL ENCOUNTER (OUTPATIENT)
Dept: CT IMAGING | Age: 69
Discharge: HOME | End: 2019-09-14
Attending: COLON & RECTAL SURGERY
Payer: MEDICARE

## 2019-09-14 LAB — CREAT BLD-MCNC: 0.7 MG/DL (ref 0.7–1.3)

## 2019-09-14 PROCEDURE — 82565 ASSAY OF CREATININE: CPT

## 2019-09-14 PROCEDURE — 74177 CT ABD & PELVIS W/CONTRAST: CPT | Performed by: COLON & RECTAL SURGERY

## 2019-09-19 ENCOUNTER — TELEPHONE (OUTPATIENT)
Dept: FAMILY MEDICINE CLINIC | Facility: CLINIC | Age: 69
End: 2019-09-19

## 2019-09-19 ENCOUNTER — TELEPHONE (OUTPATIENT)
Dept: SURGERY | Facility: CLINIC | Age: 69
End: 2019-09-19

## 2019-09-19 DIAGNOSIS — R59.0 MESENTERIC LYMPHADENOPATHY: Primary | ICD-10-CM

## 2019-09-19 NOTE — TELEPHONE ENCOUNTER
Sign off on consult with [facility] with Dr. Rudolph alexis's office. Patient is not exactly what is needed however was informed by Dr. Juhi Conde office to call us. See TE for CT results.

## 2019-09-19 NOTE — TELEPHONE ENCOUNTER
I called the patient to discuss his CT scan results. On the recent CT scan of the abdomen pelvis there was documented increase in size of several small bowel mesentery lymph nodes.   I did discuss this case at the multidisciplinary tumor board and the re

## 2019-09-19 NOTE — TELEPHONE ENCOUNTER
Patient had a CT scan with Dr. Hari Sanchez' office. Patient was referred to Dr. Ladi Awad. Patient is needing a referral for Dr. Ladi Awad. Okay to place? Please advise. Thank you!

## 2019-09-23 ENCOUNTER — TELEPHONE (OUTPATIENT)
Dept: FAMILY MEDICINE CLINIC | Facility: CLINIC | Age: 69
End: 2019-09-23

## 2019-09-23 ENCOUNTER — IMMUNIZATION (OUTPATIENT)
Dept: FAMILY MEDICINE CLINIC | Facility: CLINIC | Age: 69
End: 2019-09-23
Payer: MEDICARE

## 2019-09-23 DIAGNOSIS — Z23 NEED FOR VACCINATION: ICD-10-CM

## 2019-09-23 PROCEDURE — G0008 ADMIN INFLUENZA VIRUS VAC: HCPCS | Performed by: NURSE PRACTITIONER

## 2019-09-23 PROCEDURE — 90662 IIV NO PRSV INCREASED AG IM: CPT | Performed by: NURSE PRACTITIONER

## 2019-09-23 NOTE — TELEPHONE ENCOUNTER
Patient might have a biopsy biopsy of a mesenteric lymph node and Pt would like to notify PCP of that, Pt will be seing Dr. Abel Craig on Monday, Sept 30th. Patient was told to let PCP know, he is unsure if he will need a clearance for biopsy or not.

## 2019-09-23 NOTE — TELEPHONE ENCOUNTER
See 9/19/19 TE. Referral was already placed. Spoke with patient in office. Advised him of this information. Patient states understanding.

## 2019-09-25 ENCOUNTER — TELEPHONE (OUTPATIENT)
Dept: SURGERY | Facility: CLINIC | Age: 69
End: 2019-09-25

## 2019-09-27 ENCOUNTER — TELEPHONE (OUTPATIENT)
Dept: SURGERY | Facility: CLINIC | Age: 69
End: 2019-09-27

## 2019-09-30 ENCOUNTER — TELEPHONE (OUTPATIENT)
Dept: SURGERY | Facility: CLINIC | Age: 69
End: 2019-09-30

## 2019-09-30 ENCOUNTER — OFFICE VISIT (OUTPATIENT)
Dept: SURGERY | Facility: CLINIC | Age: 69
End: 2019-09-30
Payer: MEDICARE

## 2019-09-30 VITALS
TEMPERATURE: 98 F | BODY MASS INDEX: 22.11 KG/M2 | DIASTOLIC BLOOD PRESSURE: 72 MMHG | WEIGHT: 156.19 LBS | SYSTOLIC BLOOD PRESSURE: 132 MMHG | HEART RATE: 63 BPM | RESPIRATION RATE: 18 BRPM | HEIGHT: 70.5 IN | OXYGEN SATURATION: 100 %

## 2019-09-30 DIAGNOSIS — R59.0 MESENTERIC LYMPHADENOPATHY: Primary | ICD-10-CM

## 2019-09-30 DIAGNOSIS — I88.0 MESENTERIC ADENITIS: Primary | ICD-10-CM

## 2019-09-30 PROCEDURE — 99205 OFFICE O/P NEW HI 60 MIN: CPT | Performed by: SURGERY

## 2019-09-30 NOTE — H&P (VIEW-ONLY)
[facility]    Patient Name:  Jud Crawford   YOB: 1950   Gender:  Male   Appt Date:  9/30/2019   Provider:  Madeline Mckay MD   Insurance:  [id number]  Referring Provider: Yolanda Simeon MD    [facility] /72 (BP Location: Left arm, Patient Position: Sitting, Cuff Size: adult)   Pulse 63   Temp 97.6 °F (36.4 °C) (Tympanic)   Resp 18   Ht 1.791 m (5' 10.5\")   Wt 70.9 kg (156 lb 3.2 oz)   SpO2 100%   BMI 22.10 kg/m²      Medications Reviewed:    Jabari Moon Patient reports no rapid or irregular heartbeat. He reports no chest pain. He reports no nausea. He reports no vomiting. He reports no diarrhea. He reports no constipation. He reports no bright red blood per rectum. He reports no fatigue.  He reports no blo Multiple enlarged mesenteric lymph nodes have increased in size since prior exam.  Possibility of a developing process such as lymphoma or metastatic disease cannot be excluded. Sequelae of atypical infectious process cannot be excluded.          Procedure

## 2019-09-30 NOTE — CONSULTS
[facility]    Patient Name:  Felisa Poole   YOB: 1950   Gender:  Male   Appt Date:  9/30/2019   Provider:  Jennifer Benito MD   Insurance:  [id number]  Referring Provider: Mike Frausto MD    [facility] kg (156 lb 3.2 oz)   SpO2 100%   BMI 22.10 kg/m²      Medications Reviewed:    Current Outpatient Medications:   •  vitamin E 400 UNITS Oral Cap, Take 1 tablet by mouth daily. , Disp: , Rfl:   •  Ergocalciferol (VITAMIN D CAPS 10288 IU OR), Take 1 tablet by habits: initiating urination requires straining, no changes in urinary habits: delays in starting hesitancy, and no change in urine appearance. He reports no headache. He reports no dizziness. He reports no easy bruising tendency.  He reports no diffuse joanne diagnosis, options, and recommendations were discussed with patient at  Length. The case had been presented at our tumor board and my recommendations reflect those of the tumor board. I recommended laparoscopic biopsy.   The surgical treatment matter inclu

## 2019-09-30 NOTE — PATIENT INSTRUCTIONS
Surgery: Laparoscopic, robotic assisted mesenteric lymph node biopsy. Date of Surgery: 10/15/19     Hosptial:    [place] [address] [address], [address]  Phone: [phone]    · This is an: Outpatient procedure.   · Use the prov surgery and ask if him/her will need to see you prior to surgery. · If this is an inpatient surgery, attending the [address] Surgical Oncology Pre-operative Education Class is strongly encouraged. Email [email or web address] to RSVP or for more class info

## 2019-10-01 ENCOUNTER — TELEPHONE (OUTPATIENT)
Dept: SURGERY | Facility: CLINIC | Age: 69
End: 2019-10-01

## 2019-10-01 DIAGNOSIS — R59.0 MESENTERIC LYMPHADENOPATHY: Primary | ICD-10-CM

## 2019-10-01 NOTE — TELEPHONE ENCOUNTER
Call to pt regarding his surgery with Dr. Hedy Franklin. Requested for pt to move his surgery from 10/15/19 to 10/18/19. Pt in agreement with date change. Pt states if his pathology ends up being positive if he can get a second opinion.  Pt would like to know who t

## 2019-10-04 ENCOUNTER — TELEPHONE (OUTPATIENT)
Dept: SURGERY | Facility: CLINIC | Age: 69
End: 2019-10-04

## 2019-10-09 ENCOUNTER — TELEPHONE (OUTPATIENT)
Dept: SURGERY | Facility: CLINIC | Age: 69
End: 2019-10-09

## 2019-10-09 NOTE — TELEPHONE ENCOUNTER
Returning patient's message to address questions regarding his upcoming biopsy. Patient states he can't talk at the moment and will call back.

## 2019-10-11 ENCOUNTER — TELEPHONE (OUTPATIENT)
Dept: SURGERY | Facility: CLINIC | Age: 69
End: 2019-10-11

## 2019-10-16 ENCOUNTER — LAB ENCOUNTER (OUTPATIENT)
Dept: LAB | Age: 69
End: 2019-10-16
Attending: FAMILY MEDICINE
Payer: MEDICARE

## 2019-10-16 DIAGNOSIS — M16.11 PRIMARY OSTEOARTHRITIS OF RIGHT HIP: ICD-10-CM

## 2019-10-16 DIAGNOSIS — N40.0 BENIGN NON-NODULAR PROSTATIC HYPERPLASIA WITHOUT LOWER URINARY TRACT SYMPTOMS: ICD-10-CM

## 2019-10-16 DIAGNOSIS — R79.9 ELEVATED BUN: ICD-10-CM

## 2019-10-16 DIAGNOSIS — E78.6 LOW HDL (UNDER 40): ICD-10-CM

## 2019-10-16 PROCEDURE — 80053 COMPREHEN METABOLIC PANEL: CPT

## 2019-10-16 PROCEDURE — 80061 LIPID PANEL: CPT

## 2019-10-16 PROCEDURE — 84443 ASSAY THYROID STIM HORMONE: CPT

## 2019-10-16 PROCEDURE — 85025 COMPLETE CBC W/AUTO DIFF WBC: CPT

## 2019-10-16 PROCEDURE — 36415 COLL VENOUS BLD VENIPUNCTURE: CPT

## 2019-10-17 ENCOUNTER — ANESTHESIA EVENT (OUTPATIENT)
Dept: SURGERY | Facility: HOSPITAL | Age: 69
End: 2019-10-17

## 2019-10-18 ENCOUNTER — ANESTHESIA (OUTPATIENT)
Dept: SURGERY | Facility: HOSPITAL | Age: 69
End: 2019-10-18

## 2019-10-18 ENCOUNTER — HOSPITAL ENCOUNTER (OUTPATIENT)
Facility: HOSPITAL | Age: 69
Setting detail: HOSPITAL OUTPATIENT SURGERY
Discharge: HOME | End: 2019-10-18
Attending: SURGERY | Admitting: SURGERY
Payer: MEDICARE

## 2019-10-18 VITALS
HEIGHT: 70.5 IN | WEIGHT: 149.94 LBS | RESPIRATION RATE: 16 BRPM | TEMPERATURE: 97 F | HEART RATE: 59 BPM | DIASTOLIC BLOOD PRESSURE: 57 MMHG | SYSTOLIC BLOOD PRESSURE: 109 MMHG | BODY MASS INDEX: 21.23 KG/M2 | OXYGEN SATURATION: 100 %

## 2019-10-18 DIAGNOSIS — I88.0 MESENTERIC ADENITIS: ICD-10-CM

## 2019-10-18 PROCEDURE — 88305 TISSUE EXAM BY PATHOLOGIST: CPT | Performed by: SURGERY

## 2019-10-18 PROCEDURE — 88185 FLOWCYTOMETRY/TC ADD-ON: CPT | Performed by: SURGERY

## 2019-10-18 PROCEDURE — 88184 FLOWCYTOMETRY/ TC 1 MARKER: CPT | Performed by: SURGERY

## 2019-10-18 PROCEDURE — 88307 TISSUE EXAM BY PATHOLOGIST: CPT | Performed by: SURGERY

## 2019-10-18 PROCEDURE — 88342 IMHCHEM/IMCYTCHM 1ST ANTB: CPT | Performed by: SURGERY

## 2019-10-18 PROCEDURE — 88331 PATH CONSLTJ SURG 1 BLK 1SPC: CPT | Performed by: SURGERY

## 2019-10-18 PROCEDURE — 88341 IMHCHEM/IMCYTCHM EA ADD ANTB: CPT | Performed by: SURGERY

## 2019-10-18 PROCEDURE — 88333 PATH CONSLTJ SURG CYTO XM 1: CPT | Performed by: SURGERY

## 2019-10-18 PROCEDURE — 07BD4ZX EXCISION OF AORTIC LYMPHATIC, PERCUTANEOUS ENDOSCOPIC APPROACH, DIAGNOSTIC: ICD-10-PCS | Performed by: SURGERY

## 2019-10-18 PROCEDURE — 8E0W4CZ ROBOTIC ASSISTED PROCEDURE OF TRUNK REGION, PERCUTANEOUS ENDOSCOPIC APPROACH: ICD-10-PCS | Performed by: SURGERY

## 2019-10-18 PROCEDURE — 07BB4ZX EXCISION OF MESENTERIC LYMPHATIC, PERCUTANEOUS ENDOSCOPIC APPROACH, DIAGNOSTIC: ICD-10-PCS | Performed by: SURGERY

## 2019-10-18 RX ORDER — HYDROMORPHONE HYDROCHLORIDE 1 MG/ML
0.4 INJECTION, SOLUTION INTRAMUSCULAR; INTRAVENOUS; SUBCUTANEOUS EVERY 5 MIN PRN
Status: DISCONTINUED | OUTPATIENT
Start: 2019-10-18 | End: 2019-10-18

## 2019-10-18 RX ORDER — MIDAZOLAM HYDROCHLORIDE 1 MG/ML
1 INJECTION INTRAMUSCULAR; INTRAVENOUS EVERY 5 MIN PRN
Status: DISCONTINUED | OUTPATIENT
Start: 2019-10-18 | End: 2019-10-18

## 2019-10-18 RX ORDER — ONDANSETRON 2 MG/ML
4 INJECTION INTRAMUSCULAR; INTRAVENOUS AS NEEDED
Status: DISCONTINUED | OUTPATIENT
Start: 2019-10-18 | End: 2019-10-18

## 2019-10-18 RX ORDER — CEFAZOLIN SODIUM/WATER 2 G/20 ML
2 SYRINGE (ML) INTRAVENOUS ONCE
Status: COMPLETED | OUTPATIENT
Start: 2019-10-18 | End: 2019-10-18

## 2019-10-18 RX ORDER — MEPERIDINE HYDROCHLORIDE 25 MG/ML
12.5 INJECTION INTRAMUSCULAR; INTRAVENOUS; SUBCUTANEOUS AS NEEDED
Status: DISCONTINUED | OUTPATIENT
Start: 2019-10-18 | End: 2019-10-18

## 2019-10-18 RX ORDER — DIPHENHYDRAMINE HYDROCHLORIDE 50 MG/ML
12.5 INJECTION INTRAMUSCULAR; INTRAVENOUS AS NEEDED
Status: DISCONTINUED | OUTPATIENT
Start: 2019-10-18 | End: 2019-10-18

## 2019-10-18 RX ORDER — ACETAMINOPHEN 500 MG
1000 TABLET ORAL ONCE
Status: DISCONTINUED | OUTPATIENT
Start: 2019-10-18 | End: 2019-10-18 | Stop reason: HOSPADM

## 2019-10-18 RX ORDER — NALOXONE HYDROCHLORIDE 0.4 MG/ML
80 INJECTION, SOLUTION INTRAMUSCULAR; INTRAVENOUS; SUBCUTANEOUS AS NEEDED
Status: DISCONTINUED | OUTPATIENT
Start: 2019-10-18 | End: 2019-10-18

## 2019-10-18 RX ORDER — TRAMADOL HYDROCHLORIDE 50 MG/1
TABLET ORAL EVERY 6 HOURS PRN
Qty: 30 TABLET | Refills: 0 | Status: SHIPPED | OUTPATIENT
Start: 2019-10-18 | End: 2021-03-15 | Stop reason: ALTCHOICE

## 2019-10-18 RX ORDER — SODIUM CHLORIDE, SODIUM LACTATE, POTASSIUM CHLORIDE, CALCIUM CHLORIDE 600; 310; 30; 20 MG/100ML; MG/100ML; MG/100ML; MG/100ML
INJECTION, SOLUTION INTRAVENOUS CONTINUOUS
Status: DISCONTINUED | OUTPATIENT
Start: 2019-10-18 | End: 2019-10-18

## 2019-10-18 RX ORDER — ACETAMINOPHEN 500 MG
1000 TABLET ORAL AS NEEDED
COMMUNITY
End: 2021-04-05

## 2019-10-18 RX ORDER — LABETALOL HYDROCHLORIDE 5 MG/ML
5 INJECTION, SOLUTION INTRAVENOUS EVERY 5 MIN PRN
Status: DISCONTINUED | OUTPATIENT
Start: 2019-10-18 | End: 2019-10-18

## 2019-10-18 RX ORDER — BUPIVACAINE HYDROCHLORIDE 2.5 MG/ML
INJECTION, SOLUTION INFILTRATION; PERINEURAL AS NEEDED
Status: DISCONTINUED | OUTPATIENT
Start: 2019-10-18 | End: 2019-10-18 | Stop reason: HOSPADM

## 2019-10-18 RX ORDER — HYDROCODONE BITARTRATE AND ACETAMINOPHEN 5; 325 MG/1; MG/1
1 TABLET ORAL AS NEEDED
Status: DISCONTINUED | OUTPATIENT
Start: 2019-10-18 | End: 2019-10-18

## 2019-10-18 RX ORDER — HYDROCODONE BITARTRATE AND ACETAMINOPHEN 5; 325 MG/1; MG/1
2 TABLET ORAL AS NEEDED
Status: DISCONTINUED | OUTPATIENT
Start: 2019-10-18 | End: 2019-10-18

## 2019-10-18 NOTE — ANESTHESIA PREPROCEDURE EVALUATION
PRE-OP EVALUATION    Patient Name: Rusty Mora    Pre-op Diagnosis: Mesenteric adenitis [I88.0]    Procedure(s):  Robotic assisted laparoscopic, possible open, mesenteric lymph node biopsy    Surgeon(s) and Role:     Jing Jerome MD - Primary     * [facility] Years: 10.00        Pack years: 10        Types: Cigarettes        Quit date: 3/3/1983        Years since quittin.6      Smokeless tobacco: Never Used    Alcohol use: Yes      Comment: rarely      Drug use: No     Available pre-op labs reviewed.   Lab

## 2019-10-18 NOTE — BRIEF OP NOTE
Pre-Operative Diagnosis: Mesenteric adenitis [I88.0]     Post-Operative Diagnosis: Mesenteric adenitis [I88.0]      Procedure Performed:  Robotic assisted laparoscopic mesenteric lymph node biopsy, retroperitoneal exploration and lymph node biopsy     Surg

## 2019-10-21 ENCOUNTER — OFFICE VISIT (OUTPATIENT)
Dept: FAMILY MEDICINE CLINIC | Facility: CLINIC | Age: 69
End: 2019-10-21
Payer: MEDICARE

## 2019-10-21 VITALS
RESPIRATION RATE: 16 BRPM | TEMPERATURE: 98 F | WEIGHT: 153 LBS | HEIGHT: 70.5 IN | SYSTOLIC BLOOD PRESSURE: 118 MMHG | DIASTOLIC BLOOD PRESSURE: 76 MMHG | BODY MASS INDEX: 21.66 KG/M2 | HEART RATE: 66 BPM

## 2019-10-21 DIAGNOSIS — Z51.89 VISIT FOR WOUND CHECK: ICD-10-CM

## 2019-10-21 DIAGNOSIS — R59.0 MESENTERIC LYMPHADENOPATHY: Primary | ICD-10-CM

## 2019-10-21 DIAGNOSIS — E78.6 LOW HDL (UNDER 40): ICD-10-CM

## 2019-10-21 PROCEDURE — 99214 OFFICE O/P EST MOD 30 MIN: CPT | Performed by: FAMILY MEDICINE

## 2019-10-21 NOTE — OPERATIVE REPORT
[facility] ([facility]3]    PATIENT'S NAME: Felicia Parent   ATTENDING PHYSICIAN: Fernie Pang. Gabby Mcdowell MD   OPERATING PHYSICIAN: Fernie Pang.  Gabby Mcdowell MD   PATIENT ACCOUNT#:   [id number]    LOCATION:  [address]  MEDICAL RECORD #:   [id number]       DATE OF Hemostasis had been achieved and maintained. Trocars were removed under visualization. Larger trocar sites were reapproximated using 0 Vicryl suture. The skin was reapproximated using 4-0 Vicryl sutures. Dermabond applied.   Patient tolerated the proced

## 2019-10-22 PROBLEM — R79.9 ELEVATED BUN: Status: RESOLVED | Noted: 2019-07-22 | Resolved: 2019-10-22

## 2019-10-22 NOTE — PROGRESS NOTES
Janie Glass is a 68year old male who presents for Patient presents with: Follow - Up    HPI:   Patient's presenting for follow up from Hospital     Patient was in Hospital/ER/WIC: date(s) 10/18/19    Patient reports overall improvement.      Medication: n (OMEGA-3 FISH OIL) 1000 MG Oral Cap, Take 1 tablet by mouth daily. , Disp: , Rfl:   B Complex Vitamins (VITAMIN B-COMPLEX OR), Take 1 tablet by mouth daily. , Disp: , Rfl:   Meclizine HCl 25 MG Oral Tab, Take 1 tablet (25 mg total) by mouth 3 (three) times d oropharyngeal exudate. Eyes: Pupils are equal, round, and reactive to light. Conjunctivae and EOM are normal. Right eye exhibits no discharge. Left eye exhibits no discharge. No scleral icterus. Neck: Normal range of motion. Neck supple.  No JVD present

## 2019-10-24 ENCOUNTER — OFFICE VISIT (OUTPATIENT)
Dept: HEMATOLOGY/ONCOLOGY | Facility: HOSPITAL | Age: 69
End: 2019-10-24
Attending: INTERNAL MEDICINE
Payer: MEDICARE

## 2019-10-24 ENCOUNTER — OFFICE VISIT (OUTPATIENT)
Dept: SURGERY | Facility: CLINIC | Age: 69
End: 2019-10-24
Payer: MEDICARE

## 2019-10-24 VITALS
RESPIRATION RATE: 16 BRPM | TEMPERATURE: 97 F | DIASTOLIC BLOOD PRESSURE: 99 MMHG | BODY MASS INDEX: 21 KG/M2 | SYSTOLIC BLOOD PRESSURE: 157 MMHG | WEIGHT: 148 LBS | OXYGEN SATURATION: 99 % | HEART RATE: 73 BPM

## 2019-10-24 VITALS
TEMPERATURE: 97 F | OXYGEN SATURATION: 100 % | SYSTOLIC BLOOD PRESSURE: 159 MMHG | HEART RATE: 55 BPM | RESPIRATION RATE: 16 BRPM | WEIGHT: 149 LBS | DIASTOLIC BLOOD PRESSURE: 78 MMHG | HEIGHT: 70.5 IN | BODY MASS INDEX: 21.09 KG/M2

## 2019-10-24 DIAGNOSIS — C82.93 FOLLICULAR LYMPHOMA OF INTRA-ABDOMINAL LYMPH NODES, UNSPECIFIED GRADE (HCC): Primary | ICD-10-CM

## 2019-10-24 DIAGNOSIS — C82.00 FOLLICULAR LYMPHOMA GRADE I, UNSPECIFIED BODY REGION (HCC): Primary | ICD-10-CM

## 2019-10-24 DIAGNOSIS — C82.90 FOLLICULAR LYMPHOMA, UNSPECIFIED FOLLICULAR LYMPHOMA TYPE, UNSPECIFIED BODY REGION (HCC): ICD-10-CM

## 2019-10-24 PROCEDURE — 99205 OFFICE O/P NEW HI 60 MIN: CPT | Performed by: INTERNAL MEDICINE

## 2019-10-24 PROCEDURE — 99024 POSTOP FOLLOW-UP VISIT: CPT | Performed by: PHYSICIAN ASSISTANT

## 2019-10-24 NOTE — PROGRESS NOTES
Outpatient Oncology Care Plan   Problem list:   fatigue   Problems related to:   disease/disease progression   Interventions:   provided general teaching   Expected outcomes:   understands plan of care   Progress towards outcome: making progress     Educat

## 2019-10-24 NOTE — PROGRESS NOTES
[facility] Hematology and Oncology Clinic Note    Diagnosis:   1.  Low Grade Follicular Lymphoma    Treatment History: None     Visit Diagnosis:  Follicular lymphoma grade I, unspecified body region ([facility] [address]  (primary encounter diagnosis)  Follicular lymphoma, uns Ergocalciferol (VITAMIN D CAPS 37063 IU OR), Take 1 tablet by mouth daily. , Disp: , Rfl:   Ascorbic Acid (VITAMIN C) 500 MG Oral Cap, Take 1 tablet by mouth daily. , Disp: , Rfl:   Omega-3 Fatty Acids (OMEGA-3 FISH OIL) 1000 MG Oral Cap, Take 1 tablet by Activity      Alcohol use: Yes        Comment: rarely      Drug use: No     Family History   Problem Relation Age of Onset   • Cancer Father         prostate   • Heart Disorder Father    • Cancer Maternal Aunt    • Cancer Sister         uterine        Phys Treatment/Prophylaxis:n/a  - Goals of Care:pending   - [facility]   - Survivorship clinic? Pending     Ragini  [facility]

## 2019-10-25 NOTE — ANESTHESIA POSTPROCEDURE EVALUATION
[facility] Patient Status:  Hospital Outpatient Surgery   Age/Gender 68year old male MRN [id number]   Location [address] [place] [address] Attending No att. providers found   Hosp Day # 0 PCP Anthony Lorenz MD       Anesthesia P

## 2019-10-28 PROBLEM — C82.90 FOLLICULAR LYMPHOMA (HCC): Status: ACTIVE | Noted: 2019-10-28

## 2019-10-28 NOTE — PROGRESS NOTES
[facility]    Patient Name:  Tam Ruvalcaba   YOB: 1950   Gender:  Male   Appt Date:  10/24/2019   Provider:  JON Godfrey   [facility]     PATIENT PROVIDERS  Referring Provider: Ash Mendosa MD    Primary C arm, Patient Position: Sitting, Cuff Size: adult)   Pulse 73   Temp 97.1 °F (36.2 °C) (Tympanic)   Resp 16   Wt 67.1 kg (148 lb)   SpO2 99%   BMI 20.94 kg/m²      Medications Reviewed:    Current Outpatient Medications:   •  acetaminophen 500 MG Oral Tab, N/V  Denies dysuria or hematuria       Physical Examination:  Constitutional: General Appearance: healthy-appearing, well-nourished, and well-developed. Level of Distress: NAD. Eyes: Sclerae: non-icteric. Neck: Neck: supple.    Lymph Nodes: Lymph Nodes

## 2019-10-29 ENCOUNTER — HOSPITAL ENCOUNTER (EMERGENCY)
Facility: HOSPITAL | Age: 69
Discharge: HOME | End: 2019-10-29
Attending: EMERGENCY MEDICINE
Payer: MEDICARE

## 2019-10-29 ENCOUNTER — HOSPITAL ENCOUNTER (OUTPATIENT)
Dept: CT IMAGING | Facility: HOSPITAL | Age: 69
Discharge: HOME | End: 2019-10-29
Attending: INTERNAL MEDICINE
Payer: MEDICARE

## 2019-10-29 ENCOUNTER — NURSE ONLY (OUTPATIENT)
Dept: LAB | Facility: HOSPITAL | Age: 69
End: 2019-10-29
Attending: INTERNAL MEDICINE
Payer: MEDICARE

## 2019-10-29 VITALS
OXYGEN SATURATION: 100 % | TEMPERATURE: 98 F | SYSTOLIC BLOOD PRESSURE: 120 MMHG | HEIGHT: 70 IN | HEART RATE: 50 BPM | DIASTOLIC BLOOD PRESSURE: 75 MMHG | RESPIRATION RATE: 11 BRPM | BODY MASS INDEX: 21.9 KG/M2 | WEIGHT: 153 LBS

## 2019-10-29 VITALS
OXYGEN SATURATION: 100 % | HEIGHT: 70.5 IN | RESPIRATION RATE: 18 BRPM | BODY MASS INDEX: 21.66 KG/M2 | TEMPERATURE: 98 F | DIASTOLIC BLOOD PRESSURE: 72 MMHG | WEIGHT: 153 LBS | SYSTOLIC BLOOD PRESSURE: 118 MMHG | HEART RATE: 56 BPM

## 2019-10-29 DIAGNOSIS — C82.90 FOLLICULAR LYMPHOMA, UNSPECIFIED FOLLICULAR LYMPHOMA TYPE, UNSPECIFIED BODY REGION (HCC): ICD-10-CM

## 2019-10-29 DIAGNOSIS — C82.90 FOLLICULAR LYMPHOMA (HCC): Primary | ICD-10-CM

## 2019-10-29 DIAGNOSIS — C82.90 FOLLICULAR LYMPHOMA (HCC): ICD-10-CM

## 2019-10-29 DIAGNOSIS — R00.1 BRADYCARDIA: Primary | ICD-10-CM

## 2019-10-29 PROCEDURE — 99285 EMERGENCY DEPT VISIT HI MDM: CPT

## 2019-10-29 PROCEDURE — 20225 BONE BIOPSY TROCAR/NDL DEEP: CPT | Performed by: INTERNAL MEDICINE

## 2019-10-29 PROCEDURE — 93005 ELECTROCARDIOGRAM TRACING: CPT

## 2019-10-29 PROCEDURE — 85610 PROTHROMBIN TIME: CPT

## 2019-10-29 PROCEDURE — 77012 CT SCAN FOR NEEDLE BIOPSY: CPT | Performed by: INTERNAL MEDICINE

## 2019-10-29 PROCEDURE — 88341 IMHCHEM/IMCYTCHM EA ADD ANTB: CPT | Performed by: INTERNAL MEDICINE

## 2019-10-29 PROCEDURE — 93010 ELECTROCARDIOGRAM REPORT: CPT

## 2019-10-29 PROCEDURE — 20220 BONE BIOPSY TROCAR/NDL SUPFC: CPT | Performed by: INTERNAL MEDICINE

## 2019-10-29 PROCEDURE — 88237 TISSUE CULTURE BONE MARROW: CPT

## 2019-10-29 PROCEDURE — 88313 SPECIAL STAINS GROUP 2: CPT | Performed by: INTERNAL MEDICINE

## 2019-10-29 PROCEDURE — 88185 FLOWCYTOMETRY/TC ADD-ON: CPT

## 2019-10-29 PROCEDURE — 85097 BONE MARROW INTERPRETATION: CPT | Performed by: INTERNAL MEDICINE

## 2019-10-29 PROCEDURE — 88342 IMHCHEM/IMCYTCHM 1ST ANTB: CPT | Performed by: INTERNAL MEDICINE

## 2019-10-29 PROCEDURE — 36415 COLL VENOUS BLD VENIPUNCTURE: CPT

## 2019-10-29 PROCEDURE — 99152 MOD SED SAME PHYS/QHP 5/>YRS: CPT | Performed by: INTERNAL MEDICINE

## 2019-10-29 PROCEDURE — 88264 CHROMOSOME ANALYSIS 20-25: CPT

## 2019-10-29 PROCEDURE — 99284 EMERGENCY DEPT VISIT MOD MDM: CPT

## 2019-10-29 PROCEDURE — 88305 TISSUE EXAM BY PATHOLOGIST: CPT | Performed by: INTERNAL MEDICINE

## 2019-10-29 PROCEDURE — 88184 FLOWCYTOMETRY/ TC 1 MARKER: CPT

## 2019-10-29 PROCEDURE — 85025 COMPLETE CBC W/AUTO DIFF WBC: CPT | Performed by: EMERGENCY MEDICINE

## 2019-10-29 PROCEDURE — 88291 CYTO/MOLECULAR REPORT: CPT

## 2019-10-29 PROCEDURE — 80053 COMPREHEN METABOLIC PANEL: CPT | Performed by: EMERGENCY MEDICINE

## 2019-10-29 RX ORDER — MIDAZOLAM HYDROCHLORIDE 1 MG/ML
1 INJECTION INTRAMUSCULAR; INTRAVENOUS EVERY 5 MIN PRN
Status: ACTIVE | OUTPATIENT
Start: 2019-10-29 | End: 2019-10-29

## 2019-10-29 RX ORDER — NALOXONE HYDROCHLORIDE 0.4 MG/ML
80 INJECTION, SOLUTION INTRAMUSCULAR; INTRAVENOUS; SUBCUTANEOUS AS NEEDED
Status: DISCONTINUED | OUTPATIENT
Start: 2019-10-29 | End: 2019-10-31

## 2019-10-29 RX ORDER — MIDAZOLAM HYDROCHLORIDE 1 MG/ML
INJECTION INTRAMUSCULAR; INTRAVENOUS
Status: COMPLETED
Start: 2019-10-29 | End: 2019-10-29

## 2019-10-29 RX ORDER — SODIUM CHLORIDE 9 MG/ML
INJECTION, SOLUTION INTRAVENOUS CONTINUOUS
Status: DISCONTINUED | OUTPATIENT
Start: 2019-10-29 | End: 2019-10-31

## 2019-10-29 RX ORDER — FLUMAZENIL 0.1 MG/ML
0.2 INJECTION, SOLUTION INTRAVENOUS AS NEEDED
Status: DISCONTINUED | OUTPATIENT
Start: 2019-10-29 | End: 2019-10-31

## 2019-10-29 RX ADMIN — MIDAZOLAM HYDROCHLORIDE 1 MG: 1 INJECTION INTRAMUSCULAR; INTRAVENOUS at 09:27:00

## 2019-10-29 RX ADMIN — SODIUM CHLORIDE: 9 INJECTION, SOLUTION INTRAVENOUS at 09:15:00

## 2019-10-29 NOTE — PROCEDURES
[facility]  Procedure Note    Roberto Carlos Foot Patient Status:  Outpatient    1950 MRN [id number]   [facility] CT Attending Belkis Miles MD   Hosp Day # 0 PCP Ita Boucher MD     Procedure: CT guided bone marrow biopsy    Pre-Proce

## 2019-10-29 NOTE — H&P
[address] Patient Status:  Outpatient    1950 MRN [id number]   [facility] CT Attending Mariah Hart MD   Hosp Day # 0 PCP Junita Duane, MD     Admitting Diagnosis:   Lymphoma    History of P HCl 50 MG Oral Tab, Take 1-2 tablets ( mg total) by mouth every 6 (six) hours as needed for Pain., Disp: 30 tablet, Rfl: 0  •  Ergocalciferol (VITAMIN D CAPS 51202 IU OR), Take 1 tablet by mouth daily. , Disp: , Rfl:     Physical Exam & Review of Syst

## 2019-10-29 NOTE — ED INITIAL ASSESSMENT (HPI)
Pt to ED from [facility] with complaints of dizziness.  Per call in note, pt was bradycardic iliac biopsy, sent to ER for further eval.

## 2019-10-29 NOTE — ED PROVIDER NOTES
Patient Seen in: [facility] Emergency Department      History   Patient presents with:  Arrythmia/Palpitations (cardiovascular)    Stated Complaint: see call in note     HPI    38-year-old male sent to the emergency room from interventional radiolog Cigarettes        Quit date: 3/3/1983        Years since quittin.6      Smokeless tobacco: Never Used    Alcohol use: Yes      Frequency: Monthly or less      Drinks per session: 1 or 2      Binge frequency: Never      Comment: rarely    Drug use:  No CBC W/ DIFFERENTIAL[[id number]]          Abnormal            Final result                 Please view results for these tests on the individual orders.    RAINBOW DRAW BLUE   RAINBOW DRAW LAVENDER   RAINBOW DRAW LIGHT GREEN   RAINBOW DRAW GOLD     EK

## 2019-10-29 NOTE — IMAGING NOTE
0845:  Pt here for CT guided bone marrow aspiration accompanied by juan Roblero ([phone]), who will accompany pt post procedure  PIv placed to [address]:  Pt tolerated procedure well, alert and oriented, VSS, antigbiotic/tegaderm dressing appli

## 2019-10-29 NOTE — OR NURSING
Dr. Shalom Mooney notified of patient HR, Patient to go to the ER for evaluation per Dr. Shalom Mooney.  Patient agrees with this and is transferred to the ER

## 2019-10-30 ENCOUNTER — TELEPHONE (OUTPATIENT)
Dept: FAMILY MEDICINE CLINIC | Facility: CLINIC | Age: 69
End: 2019-10-30

## 2019-10-30 NOTE — TELEPHONE ENCOUNTER
Left generic message for patient to call back no name was listed.  Patient needs an ER follow up for Bradycardia

## 2019-11-04 ENCOUNTER — HOSPITAL ENCOUNTER (OUTPATIENT)
Dept: NUCLEAR MEDICINE | Facility: HOSPITAL | Age: 69
Discharge: HOME | End: 2019-11-04
Attending: INTERNAL MEDICINE
Payer: MEDICARE

## 2019-11-04 DIAGNOSIS — C82.90 FOLLICULAR LYMPHOMA, UNSPECIFIED FOLLICULAR LYMPHOMA TYPE, UNSPECIFIED BODY REGION (HCC): ICD-10-CM

## 2019-11-04 PROCEDURE — 82962 GLUCOSE BLOOD TEST: CPT

## 2019-11-04 PROCEDURE — 78815 PET IMAGE W/CT SKULL-THIGH: CPT | Performed by: INTERNAL MEDICINE

## 2019-11-07 ENCOUNTER — TELEPHONE (OUTPATIENT)
Dept: FAMILY MEDICINE CLINIC | Facility: CLINIC | Age: 69
End: 2019-11-07

## 2019-11-07 DIAGNOSIS — C82.93 FOLLICULAR LYMPHOMA OF INTRA-ABDOMINAL LYMPH NODES, UNSPECIFIED GRADE (HCC): Primary | ICD-10-CM

## 2019-11-07 NOTE — TELEPHONE ENCOUNTER
[facility] heme/onc office reached out to our office. Patient is schedule for an appointment on Monday but no referral in Epic. Referral placed as requested.

## 2019-11-08 ENCOUNTER — TELEPHONE (OUTPATIENT)
Dept: FAMILY MEDICINE CLINIC | Facility: CLINIC | Age: 69
End: 2019-11-08

## 2019-11-08 NOTE — PROGRESS NOTES
[facility] Clinic Note    Diagnosis:   1. Stage I Low Grade Follicular Lymphoma  2.  FLIPI Score 1     Treatment History: None     Visit Diagnosis:  Follicular lymphoma of intra-abdominal lymph nodes, unspecified grade ([facility] [address]    History o I FL, FLIPI 1. He is here to discuss results. PET/CT with only mesenteric LAD with an SUV of 3.9. BM was sub-optimal but flow was negative and no BM activity on PET. No fevers, chills or night sweats.  He is on a plant based diet and very active and has los Performed by Essie Rebollar MD at [facility] MAIN OR     Social History    Socioeconomic History      Marital status: Single      Spouse name: Not on file      Number of children: Not on file      Years of education: Not on file      Highest education level: Not o Follicular Lymphoma  - FLIPI Score 1: due to age  - PET/CT as above with only mesenteric LAD  - Bone marrow biopsy was sub-optimal, but appears to be negative. No BM activity on PET  - Discussed that he has stage I disease.  Given location of disease, obser

## 2019-11-08 NOTE — TELEPHONE ENCOUNTER
Patient received a call from our office yesterday and was calling back. I informed the patient of the approved referral.  Pt indicated that if anything is needed from him to please call him,otherwise disregard.

## 2019-11-11 ENCOUNTER — OFFICE VISIT (OUTPATIENT)
Dept: HEMATOLOGY/ONCOLOGY | Age: 69
End: 2019-11-11
Attending: INTERNAL MEDICINE
Payer: MEDICARE

## 2019-11-11 VITALS
BODY MASS INDEX: 20.58 KG/M2 | DIASTOLIC BLOOD PRESSURE: 68 MMHG | SYSTOLIC BLOOD PRESSURE: 135 MMHG | HEART RATE: 59 BPM | WEIGHT: 145.38 LBS | RESPIRATION RATE: 18 BRPM | TEMPERATURE: 97 F | OXYGEN SATURATION: 100 % | HEIGHT: 70.51 IN

## 2019-11-11 DIAGNOSIS — C82.93 FOLLICULAR LYMPHOMA OF INTRA-ABDOMINAL LYMPH NODES, UNSPECIFIED GRADE (HCC): ICD-10-CM

## 2019-11-11 PROCEDURE — 99214 OFFICE O/P EST MOD 30 MIN: CPT | Performed by: INTERNAL MEDICINE

## 2019-11-11 NOTE — PROGRESS NOTES
Education Record    Learner:  Patient    Disease / Luvenia Nims     Barriers / Limitations:  None   Comments:    Method:  Discussion   Comments:    General Topics:  Plan of care reviewed   Comments:    Outcome:  Shows understanding   Comments:    Ca Mansfield

## 2019-12-03 ENCOUNTER — HOSPITAL ENCOUNTER (OUTPATIENT)
Dept: RADIATION ONCOLOGY | Age: 69
Discharge: HOME | End: 2019-12-03
Attending: RADIOLOGY
Payer: MEDICARE

## 2019-12-03 VITALS
WEIGHT: 147.81 LBS | OXYGEN SATURATION: 100 % | TEMPERATURE: 97 F | HEIGHT: 70 IN | BODY MASS INDEX: 21.16 KG/M2 | HEART RATE: 65 BPM | RESPIRATION RATE: 16 BRPM | SYSTOLIC BLOOD PRESSURE: 133 MMHG | DIASTOLIC BLOOD PRESSURE: 71 MMHG

## 2019-12-03 DIAGNOSIS — R59.0 MESENTERIC LYMPHADENOPATHY: ICD-10-CM

## 2019-12-03 PROCEDURE — 99214 OFFICE O/P EST MOD 30 MIN: CPT

## 2019-12-03 RX ORDER — UBIDECARENONE 75 MG
250 CAPSULE ORAL DAILY
COMMUNITY

## 2019-12-03 RX ORDER — CHLORAL HYDRATE 500 MG
1000 CAPSULE ORAL DAILY
COMMUNITY

## 2019-12-03 NOTE — PROGRESS NOTES
Nursing Consultation Note  Patient: Tam Ruvalcaba  YOB: 1950  Age: 68year old  Radiation Oncologist: Dr. Shirlene Sawant  Referring Physician: Aamir Schreiber, Dr. Rajni Bejarano, Dr. Luz Marina Franklin (PCP)  Diagnosis: FOLLICULAR LYMPHOMA  Consult Date: 12/3/20 prostate causes nocturia x 2   Skin: Negative. Allergic/Immunologic: Negative. Neurological: Negative. Hematological: Negative. Psychiatric/Behavioral: Negative.            Allergies:  No Known Allergies    Current Outpatient Medications   Medic name: Not on file      Number of children: 0      Years of education: Not on file      Highest education level: Not on file    Occupational History      Not on file    Social Needs      Financial resource strain: Not on file      Food insecurity:         Wo Education: YES  Knowledge Deficit Plan Of Care:    Problem:  Knowledge Deficit    Problems related to:    Radiation therapy    Interventions:   Instruct on purpose of radiation therapy    Expected Outcomes:  Knowledge of radiation therapy    Progress To

## 2019-12-03 NOTE — CONSULTS
RADIATION ONCOLOGY NOTE    DATE OF VISIT: 12/3/2019    DIAGNOSIS :   Stage IA Low Grade Follicular Lymphoma, FLIPI Score 1      Dear Henry Villalta  and colleagues,    Thank you very much for asking us to evaluate your patient.   As you recall, J 3.9.  The findings would be consistent with lymphoma given the history. 2. No additional abnormal FDG activity.  3. There is a new water density soft tissue mass in the periaortic space in the upper abdomen measuring 2.7 x 2.5 cm and a similarly appearing f no past medical history of Anesthesia complication, Difficult intubation, Exposure to medical diagnostic radiation, Malignant hyperthermia, Personal history of antineoplastic chemotherapy, PONV (postoperative nausea and vomiting), or Pseudocholinesterase d laboratory studies. ASSESSMENT/PLAN    68year old male recently diagnosed with Stage I Low Grade Follicular Lymphoma. Laproscopic robotic assisted mesenteric and RP LN bx revealed 1 Mesenteric LN c/w Low grade follicular lymphoma:  BMB neg.   PET/CT: E

## 2019-12-04 ENCOUNTER — APPOINTMENT (OUTPATIENT)
Dept: RADIATION ONCOLOGY | Age: 69
End: 2019-12-04
Attending: INTERNAL MEDICINE
Payer: MEDICARE

## 2020-02-17 ENCOUNTER — TELEPHONE (OUTPATIENT)
Dept: HEMATOLOGY/ONCOLOGY | Facility: HOSPITAL | Age: 70
End: 2020-02-17

## 2020-02-17 NOTE — TELEPHONE ENCOUNTER
Spoke with patient and call center. Patient needs to schedule follow-up with radiation oncology prior to any CT scan orders being placed. They verbalized understanding.

## 2020-02-17 NOTE — TELEPHONE ENCOUNTER
LVM for patient letting him know he should follow-up with radiation oncology regarding frequency of scans. He should then follow-up with Dr. Alfredo Veliz after talking with rad//onc. Contact information provided.

## 2020-02-17 NOTE — TELEPHONE ENCOUNTER
Madelin Phelps called and said that he was attempting to get his CT scheduled that Dr. Fernando Bermudez wants him to get done but was informed that there was no order placed from the Dr. He asked that the order please be sent in and that he be let known when it has been sent

## 2020-03-03 DIAGNOSIS — C85.90 LYMPHOMA (HCC): Primary | ICD-10-CM

## 2020-03-06 ENCOUNTER — APPOINTMENT (OUTPATIENT)
Dept: RADIATION ONCOLOGY | Age: 70
End: 2020-03-06
Attending: RADIOLOGY
Payer: MEDICARE

## 2020-03-13 ENCOUNTER — APPOINTMENT (OUTPATIENT)
Dept: RADIATION ONCOLOGY | Age: 70
End: 2020-03-13
Attending: RADIOLOGY
Payer: MEDICARE

## 2020-03-13 ENCOUNTER — HOSPITAL ENCOUNTER (OUTPATIENT)
Dept: CT IMAGING | Age: 70
Discharge: HOME | End: 2020-03-13
Attending: RADIOLOGY
Payer: MEDICARE

## 2020-03-13 DIAGNOSIS — C85.90 LYMPHOMA (HCC): ICD-10-CM

## 2020-03-13 LAB — CREAT BLD-MCNC: 0.8 MG/DL (ref 0.7–1.3)

## 2020-03-13 PROCEDURE — 82565 ASSAY OF CREATININE: CPT

## 2020-03-13 PROCEDURE — 74177 CT ABD & PELVIS W/CONTRAST: CPT | Performed by: RADIOLOGY

## 2020-03-17 NOTE — PROGRESS NOTES
Nursing Follow-Up Note    Patient: Joi Mendez  YOB: 1950  Age: 68year old  Radiation Oncologist: Dr. Raphael Gómez  Referring Physician: Salma Rouse, Dr. Summer Vivar, Dr. Hubert Santos (PCP)  Chief Complaint: LYMPHOMA  Date: 3/18/2020    Toxicities:

## 2020-03-18 ENCOUNTER — HOSPITAL ENCOUNTER (OUTPATIENT)
Dept: RADIATION ONCOLOGY | Age: 70
Discharge: HOME | End: 2020-03-18
Attending: RADIOLOGY
Payer: MEDICARE

## 2020-03-18 ENCOUNTER — TELEPHONE (OUTPATIENT)
Dept: HEMATOLOGY/ONCOLOGY | Facility: HOSPITAL | Age: 70
End: 2020-03-18

## 2020-03-18 VITALS
TEMPERATURE: 97 F | RESPIRATION RATE: 16 BRPM | DIASTOLIC BLOOD PRESSURE: 68 MMHG | WEIGHT: 149.5 LBS | BODY MASS INDEX: 21 KG/M2 | HEART RATE: 55 BPM | SYSTOLIC BLOOD PRESSURE: 129 MMHG | OXYGEN SATURATION: 100 %

## 2020-03-18 DIAGNOSIS — R59.0 MESENTERIC LYMPHADENOPATHY: ICD-10-CM

## 2020-03-18 PROCEDURE — 99213 OFFICE O/P EST LOW 20 MIN: CPT

## 2020-03-18 RX ORDER — RIBOFLAVIN (VITAMIN B2) 100 MG
100 TABLET ORAL DAILY
COMMUNITY

## 2020-03-18 NOTE — PATIENT INSTRUCTIONS
- CALL [phone] FOR A FOLLOW-UP WITH DR. NGUYEN IN 6-9 MONTHS (SEPT TO NOV 2020)  - CALL TO MAKE A FOLLOW-UP APPOINTMENT WITH DR. Fermín Ward IN 3-4 MONTHS  - CALL IF YOU HAVE ANY QUESTIONS/CONCERNS REGARDING RADIATION THERAPY

## 2020-03-18 NOTE — TELEPHONE ENCOUNTER
Spoke with patient. Appointment made. Claude Hung, MD  P Codyw India Gilbert Rns             Results reviewed. He met with Radiation Oncology today for follow up. I can see him in 3 months. Thanks!

## 2020-03-18 NOTE — PROGRESS NOTES
RADIATION ONCOLOGY NOTE    DATE OF VISIT: 3/18/2020    DIAGNOSIS :   Stage IA Low Grade Follicular Lymphoma, FLIPI Score 1, doing well clinically, currently being observed      Dear Henry Chaves  and colleagues,        As you recall, Seth Castillo smaller or resolved. This was reviewed with pt. Current Outpatient Medications   Medication Sig Dispense Refill   • Ascorbic Acid (VITAMIN C) 100 MG Oral Tab Take 100 mg by mouth daily.      • vitamin E 100 UNITS Oral Cap Take 100 Units by mouth tracy maternal aunt, and sister; Heart Disorder in his father.     Wt Readings from Last 6 Encounters:  03/18/20 : 67.8 kg (149 lb 8 oz)  12/03/19 : 67 kg (147 lb 12.8 oz)  11/11/19 : 66 kg (145 lb 6.4 oz)  10/29/19 : 69.4 kg (153 lb)  10/25/19 : 69.4 kg (153 lb) unspecified, unspecified site     TECHNIQUE:  CT scanning was performed from the dome of the diaphragm to the pubic symphysis with non-ionic intravenous contrast material. Post contrast coronal MPR imaging was performed.   Dose reduction techniques were use pulmonary or pleural disease. CONCLUSION:    1. Several of the mesenteric nodes identified on prior study have decreased in size with 1 no longer appreciated. However, there is a single new 1.5 cm node present, correlate clinically.         Dictated

## 2020-06-15 ENCOUNTER — OFFICE VISIT (OUTPATIENT)
Dept: HEMATOLOGY/ONCOLOGY | Facility: HOSPITAL | Age: 70
End: 2020-06-15
Attending: INTERNAL MEDICINE
Payer: MEDICARE

## 2020-06-15 VITALS
SYSTOLIC BLOOD PRESSURE: 125 MMHG | RESPIRATION RATE: 16 BRPM | DIASTOLIC BLOOD PRESSURE: 71 MMHG | HEART RATE: 59 BPM | TEMPERATURE: 99 F | HEIGHT: 70 IN | OXYGEN SATURATION: 100 % | WEIGHT: 144 LBS | BODY MASS INDEX: 20.62 KG/M2

## 2020-06-15 DIAGNOSIS — C82.93 FOLLICULAR LYMPHOMA OF INTRA-ABDOMINAL LYMPH NODES, UNSPECIFIED GRADE (HCC): Primary | ICD-10-CM

## 2020-06-15 PROCEDURE — 99215 OFFICE O/P EST HI 40 MIN: CPT | Performed by: INTERNAL MEDICINE

## 2020-06-15 NOTE — PROGRESS NOTES
THE [facility] Hematology and Oncology Clinic Note    Diagnosis:   1. Stage I Low Grade Follicular Lymphoma  2.  FLIPI Score 1     Treatment History: None     Visit Diagnosis:  Follicular lymphoma of intra-abdominal lymph nodes, unspecified grade ([address]  (primary en · 3/13/20: CT AP: Several mesenteric nodes have decreased, there was one new LN measuring in 1.5 cm in the mesentery   · 3/18/20: He met with Dr. Lux Medrano in Radiation Oncology: He was offered ISRT and Observation and opted for observation.      Interval Histor APPROACH Right 3/22/2017    Performed by Cydney Gee MD at [facility] MAIN OR   • OTHER SURGICAL HISTORY  10/18/2019    Laparoscopic robotic-assisted mesenteric and retroperitoneal lymph node biopsies   • TOTAL HIP REPLACEMENT Right    • XI ROBOT-ASSISTED LAPAROS 10/29/2019     Lab Results   Component Value Date     10/29/2019    K 4.1 10/29/2019    CO2 29.0 10/29/2019     10/29/2019    BUN 8 10/29/2019    ALB 3.7 10/29/2019       Radiology: I personally reviewed the imaging    Pathology: reviewed   10/

## 2020-06-25 ENCOUNTER — PATIENT MESSAGE (OUTPATIENT)
Dept: FAMILY MEDICINE CLINIC | Facility: CLINIC | Age: 70
End: 2020-06-25

## 2020-06-25 DIAGNOSIS — E78.6 LOW HDL (UNDER 40): Primary | ICD-10-CM

## 2020-06-25 NOTE — TELEPHONE ENCOUNTER
----- Message from Anson Oswald sent at 6/25/2020  9:41 AM CDT -----  Regarding: Non-Urgent Medical Question  Contact: [phone]  Dr. Melia Fraser,    For my upcoming appt.  Jul. 24.  Do I understand correctly it is my annual physical?  My quest. is  does it r

## 2020-07-24 ENCOUNTER — OFFICE VISIT (OUTPATIENT)
Dept: FAMILY MEDICINE CLINIC | Facility: CLINIC | Age: 70
End: 2020-07-24
Payer: MEDICARE

## 2020-07-24 ENCOUNTER — APPOINTMENT (OUTPATIENT)
Dept: LAB | Age: 70
End: 2020-07-24
Attending: FAMILY MEDICINE
Payer: MEDICARE

## 2020-07-24 VITALS
RESPIRATION RATE: 16 BRPM | DIASTOLIC BLOOD PRESSURE: 64 MMHG | HEIGHT: 70 IN | TEMPERATURE: 98 F | OXYGEN SATURATION: 98 % | BODY MASS INDEX: 21.19 KG/M2 | WEIGHT: 148 LBS | SYSTOLIC BLOOD PRESSURE: 118 MMHG | HEART RATE: 60 BPM

## 2020-07-24 DIAGNOSIS — Z12.5 SCREENING PSA (PROSTATE SPECIFIC ANTIGEN): ICD-10-CM

## 2020-07-24 DIAGNOSIS — Z96.641 STATUS POST TOTAL REPLACEMENT OF RIGHT HIP: ICD-10-CM

## 2020-07-24 DIAGNOSIS — E78.6 LOW HDL (UNDER 40): ICD-10-CM

## 2020-07-24 DIAGNOSIS — M16.11 PRIMARY OSTEOARTHRITIS OF RIGHT HIP: ICD-10-CM

## 2020-07-24 DIAGNOSIS — C82.93 FOLLICULAR LYMPHOMA OF INTRA-ABDOMINAL LYMPH NODES, UNSPECIFIED GRADE (HCC): ICD-10-CM

## 2020-07-24 DIAGNOSIS — N40.0 BENIGN NON-NODULAR PROSTATIC HYPERPLASIA WITHOUT LOWER URINARY TRACT SYMPTOMS: ICD-10-CM

## 2020-07-24 DIAGNOSIS — R59.0 MESENTERIC LYMPHADENOPATHY: ICD-10-CM

## 2020-07-24 DIAGNOSIS — K40.90 RIGHT INGUINAL HERNIA: ICD-10-CM

## 2020-07-24 DIAGNOSIS — Z00.00 MEDICARE ANNUAL WELLNESS VISIT, SUBSEQUENT: Primary | ICD-10-CM

## 2020-07-24 DIAGNOSIS — E55.9 VITAMIN D DEFICIENCY: ICD-10-CM

## 2020-07-24 LAB
CHOLEST SMN-MCNC: 115 MG/DL (ref ?–200)
COMPLEXED PSA SERPL-MCNC: 3.3 NG/ML (ref ?–4)
HDLC SERPL-MCNC: 42 MG/DL (ref 40–59)
LDLC SERPL CALC-MCNC: 62 MG/DL (ref ?–100)
NONHDLC SERPL-MCNC: 73 MG/DL (ref ?–130)
PATIENT FASTING Y/N/NP: YES
TRIGL SERPL-MCNC: 57 MG/DL (ref 30–149)
TSI SER-ACNC: 1.19 MIU/ML (ref 0.36–3.74)
VLDLC SERPL CALC-MCNC: 11 MG/DL (ref 0–30)

## 2020-07-24 PROCEDURE — 3078F DIAST BP <80 MM HG: CPT | Performed by: FAMILY MEDICINE

## 2020-07-24 PROCEDURE — 99397 PER PM REEVAL EST PAT 65+ YR: CPT | Performed by: FAMILY MEDICINE

## 2020-07-24 PROCEDURE — 36415 COLL VENOUS BLD VENIPUNCTURE: CPT

## 2020-07-24 PROCEDURE — G0439 PPPS, SUBSEQ VISIT: HCPCS | Performed by: FAMILY MEDICINE

## 2020-07-24 PROCEDURE — 3074F SYST BP LT 130 MM HG: CPT | Performed by: FAMILY MEDICINE

## 2020-07-24 PROCEDURE — 96160 PT-FOCUSED HLTH RISK ASSMT: CPT | Performed by: FAMILY MEDICINE

## 2020-07-24 PROCEDURE — 84443 ASSAY THYROID STIM HORMONE: CPT

## 2020-07-24 PROCEDURE — 80061 LIPID PANEL: CPT

## 2020-07-24 PROCEDURE — 3008F BODY MASS INDEX DOCD: CPT | Performed by: FAMILY MEDICINE

## 2020-07-24 NOTE — PROGRESS NOTES
HPI:   Deanne Barton is a 65year old male who presents for a MA (Medicare Advantage) Supervisit (Once per calendar year).       His last annual assessment has been over 1 year: Annual Physical due on 07/22/2020         Fall/Risk Assessment   He has been scre Juan Miguel Awan, RN as Registered Nurse (Registered Nurse)    Patient Active Problem List:     Benign non-nodular prostatic hyperplasia without lower urinary tract symptoms     Primary osteoarthritis of right hip          Global exp 6-20-17 / RIGHT Denis Strange / Chari Groves / E appendectomy; total hip replacement (Right); colonoscopy (2014); appendectomy; hip replacement surgery (Right, 2017); and tonsillectomy (grammar elvin. years). His family history includes Cancer in his father, maternal aunt, and sister;  Heart Disorder in mucosa, and tongue normal; teeth and gums normal   Neck: Supple, symmetrical, trachea midline, no adenopathy, thyroid: not enlarged, symmetric, no tenderness/mass/nodules, no carotid bruit or JVD   Back:   Symmetric, no curvature, ROM normal, no CVA tender CPM  - PSA SCREEN; Future    9. Vitamin D deficiency  -stable, due for labs. - VITAMIN D, 25-HYDROXY; Future    10. Right inguinal hernia  -stable, bit larger, reducible.  CPM    Diet assessment: good     PLAN:  The patient indicates understanding of thes Immunizations (Update Immunization Activity if applicable)     Influenza  Covered Annually 9/23/2019   Please get every year    Pneumococcal 13 (Prevnar)  Covered Once after 65 05/14/2018 Please get once after your 65th birthday    Pneumococcal 23 (Pne

## 2020-07-26 PROBLEM — K40.90 RIGHT INGUINAL HERNIA: Status: ACTIVE | Noted: 2020-07-26

## 2020-07-26 PROBLEM — Z12.5 SCREENING PSA (PROSTATE SPECIFIC ANTIGEN): Status: ACTIVE | Noted: 2020-07-26

## 2020-07-26 PROBLEM — E55.9 VITAMIN D DEFICIENCY: Status: ACTIVE | Noted: 2020-07-26

## 2020-10-08 ENCOUNTER — IMMUNIZATION (OUTPATIENT)
Dept: FAMILY MEDICINE CLINIC | Facility: CLINIC | Age: 70
End: 2020-10-08
Payer: MEDICARE

## 2020-10-08 DIAGNOSIS — Z23 NEED FOR VACCINATION: ICD-10-CM

## 2020-10-08 PROCEDURE — G0008 ADMIN INFLUENZA VIRUS VAC: HCPCS | Performed by: FAMILY MEDICINE

## 2020-10-08 PROCEDURE — 90662 IIV NO PRSV INCREASED AG IM: CPT | Performed by: FAMILY MEDICINE

## 2020-10-09 ENCOUNTER — MED REC SCAN ONLY (OUTPATIENT)
Dept: FAMILY MEDICINE CLINIC | Facility: CLINIC | Age: 70
End: 2020-10-09

## 2020-11-30 ENCOUNTER — PATIENT MESSAGE (OUTPATIENT)
Dept: FAMILY MEDICINE CLINIC | Facility: CLINIC | Age: 70
End: 2020-11-30

## 2020-12-01 NOTE — TELEPHONE ENCOUNTER
From: Claudette Read  To: Alli Gonzalez MD  Sent: 11/30/2020 9:50 PM CST  Subject: Non-Urgent Medical Question    Dr. Dorothea Cardenas I would like to receive the Covid19 vaccine once it can be safe and effective. I hear different  labs have different serums.  Once the

## 2020-12-12 ENCOUNTER — HOSPITAL ENCOUNTER (OUTPATIENT)
Dept: CT IMAGING | Age: 70
Discharge: HOME | End: 2020-12-12
Attending: INTERNAL MEDICINE
Payer: MEDICARE

## 2020-12-12 DIAGNOSIS — C82.93 FOLLICULAR LYMPHOMA OF INTRA-ABDOMINAL LYMPH NODES, UNSPECIFIED GRADE (HCC): ICD-10-CM

## 2020-12-12 PROCEDURE — 74177 CT ABD & PELVIS W/CONTRAST: CPT | Performed by: INTERNAL MEDICINE

## 2020-12-12 PROCEDURE — 82565 ASSAY OF CREATININE: CPT

## 2020-12-12 PROCEDURE — 71260 CT THORAX DX C+: CPT | Performed by: INTERNAL MEDICINE

## 2020-12-14 ENCOUNTER — TELEPHONE (OUTPATIENT)
Dept: HEMATOLOGY/ONCOLOGY | Facility: HOSPITAL | Age: 70
End: 2020-12-14

## 2020-12-14 NOTE — TELEPHONE ENCOUNTER
LVM for patient requesting call back to discuss results. Contact information provided. Leah Carrington MD  P Edw Bcn Vladimir Rns             Results reviewed. Lymph node is stable and slightly down in size. I can see him in 3 months for labs.  Thanks

## 2021-03-11 NOTE — PROGRESS NOTES
THE [facility] Hematology and Oncology Clinic Note    Diagnosis:   1. Stage I Low Grade Follicular Lymphoma  2.  FLIPI Score 1     Treatment History: None     Visit Diagnosis:  Follicular lymphoma of intra-abdominal lymph nodes, unspecified grade ([address]  (primary en of 3.9.   · 3/13/20: CT AP: Several mesenteric nodes have decreased, there was one new LN measuring in 1.5 cm in the mesentery   · 3/18/20: He met with Dr. Buck Corrales in Radiation Oncology: He was offered ISRT and Observation and opted for observation.   · 12/12/20 2014   • HIP REPLACEMENT SURGERY Right 2017   • HIP TOTAL ANTERIOR APPROACH Right 3/22/2017    Performed by Cheryl Galvan MD at [facility] MAIN OR   • OTHER SURGICAL HISTORY  10/18/2019    Laparoscopic robotic-assisted mesenteric and retroperitoneal lymph node bio 12.1 (L) 03/15/2021    HCT 36.1 (L) 03/15/2021    MCV 94.0 03/15/2021    .0 03/15/2021     Lab Results   Component Value Date     06/15/2020    K 4.0 06/15/2020    CO2 29.0 06/15/2020     06/15/2020    BUN 13 06/15/2020    ALB 3.6 06/15/ [facility]

## 2021-03-12 DIAGNOSIS — Z23 NEED FOR VACCINATION: ICD-10-CM

## 2021-03-15 ENCOUNTER — OFFICE VISIT (OUTPATIENT)
Dept: HEMATOLOGY/ONCOLOGY | Age: 71
End: 2021-03-15
Attending: INTERNAL MEDICINE
Payer: MEDICARE

## 2021-03-15 VITALS
OXYGEN SATURATION: 100 % | HEART RATE: 65 BPM | RESPIRATION RATE: 18 BRPM | WEIGHT: 157 LBS | DIASTOLIC BLOOD PRESSURE: 71 MMHG | TEMPERATURE: 97 F | SYSTOLIC BLOOD PRESSURE: 146 MMHG | BODY MASS INDEX: 23 KG/M2

## 2021-03-15 DIAGNOSIS — D64.9 NORMOCYTIC ANEMIA: ICD-10-CM

## 2021-03-15 DIAGNOSIS — C82.93 FOLLICULAR LYMPHOMA OF INTRA-ABDOMINAL LYMPH NODES, UNSPECIFIED GRADE (HCC): Primary | ICD-10-CM

## 2021-03-15 LAB
ALBUMIN SERPL-MCNC: 3.5 G/DL (ref 3.4–5)
ALBUMIN/GLOB SERPL: 1.1 {RATIO} (ref 1–2)
ALP LIVER SERPL-CCNC: 63 U/L
ALT SERPL-CCNC: 23 U/L
ANION GAP SERPL CALC-SCNC: 3 MMOL/L (ref 0–18)
AST SERPL-CCNC: 18 U/L (ref 15–37)
BASOPHILS # BLD AUTO: 0.04 X10(3) UL (ref 0–0.2)
BASOPHILS NFR BLD AUTO: 0.9 %
BILIRUB SERPL-MCNC: 0.3 MG/DL (ref 0.1–2)
BUN BLD-MCNC: 11 MG/DL (ref 7–18)
BUN/CREAT SERPL: 14.1 (ref 10–20)
CALCIUM BLD-MCNC: 9.5 MG/DL (ref 8.5–10.1)
CHLORIDE SERPL-SCNC: 106 MMOL/L (ref 98–112)
CO2 SERPL-SCNC: 31 MMOL/L (ref 21–32)
CREAT BLD-MCNC: 0.78 MG/DL
DEPRECATED HBV CORE AB SER IA-ACNC: 180.6 NG/ML
DEPRECATED RDW RBC AUTO: 41.9 FL (ref 35.1–46.3)
EOSINOPHIL # BLD AUTO: 0.13 X10(3) UL (ref 0–0.7)
EOSINOPHIL NFR BLD AUTO: 2.8 %
ERYTHROCYTE [DISTWIDTH] IN BLOOD BY AUTOMATED COUNT: 12.1 % (ref 11–15)
FOLATE SERPL-MCNC: 41.8 NG/ML (ref 8.7–?)
GLOBULIN PLAS-MCNC: 3.3 G/DL (ref 2.8–4.4)
GLUCOSE BLD-MCNC: 102 MG/DL (ref 70–99)
HCT VFR BLD AUTO: 36.1 %
HGB BLD-MCNC: 12.1 G/DL
IMM GRANULOCYTES # BLD AUTO: 0.01 X10(3) UL (ref 0–1)
IMM GRANULOCYTES NFR BLD: 0.2 %
IRON SATURATION: 19 %
IRON SERPL-MCNC: 62 UG/DL
LDH SERPL L TO P-CCNC: 137 U/L
LYMPHOCYTES # BLD AUTO: 1.66 X10(3) UL (ref 1–4)
LYMPHOCYTES NFR BLD AUTO: 36.3 %
M PROTEIN MFR SERPL ELPH: 6.8 G/DL (ref 6.4–8.2)
MCH RBC QN AUTO: 31.5 PG (ref 26–34)
MCHC RBC AUTO-ENTMCNC: 33.5 G/DL (ref 31–37)
MCV RBC AUTO: 94 FL
MONOCYTES # BLD AUTO: 0.44 X10(3) UL (ref 0.1–1)
MONOCYTES NFR BLD AUTO: 9.6 %
NEUTROPHILS # BLD AUTO: 2.29 X10 (3) UL (ref 1.5–7.7)
NEUTROPHILS # BLD AUTO: 2.29 X10(3) UL (ref 1.5–7.7)
NEUTROPHILS NFR BLD AUTO: 50.2 %
OSMOLALITY SERPL CALC.SUM OF ELEC: 290 MOSM/KG (ref 275–295)
PATIENT FASTING Y/N/NP: NO
PLATELET # BLD AUTO: 274 10(3)UL (ref 150–450)
POTASSIUM SERPL-SCNC: 4 MMOL/L (ref 3.5–5.1)
RBC # BLD AUTO: 3.84 X10(6)UL
SODIUM SERPL-SCNC: 140 MMOL/L (ref 136–145)
TOTAL IRON BINDING CAPACITY: 326 UG/DL (ref 240–450)
TRANSFERRIN SERPL-MCNC: 219 MG/DL (ref 200–360)
VIT B12 SERPL-MCNC: 384 PG/ML (ref 193–986)
WBC # BLD AUTO: 4.6 X10(3) UL (ref 4–11)

## 2021-03-15 PROCEDURE — 99214 OFFICE O/P EST MOD 30 MIN: CPT | Performed by: INTERNAL MEDICINE

## 2021-03-15 NOTE — PROGRESS NOTES
Outpatient Oncology Care Plan   Problem list:   knowledge deficit   Problems related to:   self care   Interventions:   provided general teaching   Expected outcomes:   understands plan of care   Progress towards outcome: making progress     Education Waqar

## 2021-03-16 ENCOUNTER — TELEPHONE (OUTPATIENT)
Dept: SURGERY | Facility: CLINIC | Age: 71
End: 2021-03-16

## 2021-03-16 NOTE — TELEPHONE ENCOUNTER
Gaila Severs, MD  P Emg Gen Surg Nurse  Please reach out to the patient to set up an office visit to discus inguinal hernia. Thanks. Called patient. No answer. LMTCB.

## 2021-04-05 ENCOUNTER — OFFICE VISIT (OUTPATIENT)
Dept: SURGERY | Facility: CLINIC | Age: 71
End: 2021-04-05
Payer: MEDICARE

## 2021-04-05 VITALS
BODY MASS INDEX: 22.33 KG/M2 | HEIGHT: 70 IN | HEART RATE: 60 BPM | DIASTOLIC BLOOD PRESSURE: 76 MMHG | WEIGHT: 156 LBS | SYSTOLIC BLOOD PRESSURE: 124 MMHG | TEMPERATURE: 97 F

## 2021-04-05 DIAGNOSIS — C82.93 FOLLICULAR LYMPHOMA OF INTRA-ABDOMINAL LYMPH NODES, UNSPECIFIED GRADE (HCC): ICD-10-CM

## 2021-04-05 DIAGNOSIS — K40.90 RIGHT INGUINAL HERNIA: Primary | ICD-10-CM

## 2021-04-05 PROCEDURE — 3008F BODY MASS INDEX DOCD: CPT | Performed by: COLON & RECTAL SURGERY

## 2021-04-05 PROCEDURE — 99214 OFFICE O/P EST MOD 30 MIN: CPT | Performed by: COLON & RECTAL SURGERY

## 2021-04-05 PROCEDURE — 3074F SYST BP LT 130 MM HG: CPT | Performed by: COLON & RECTAL SURGERY

## 2021-04-05 PROCEDURE — 3078F DIAST BP <80 MM HG: CPT | Performed by: COLON & RECTAL SURGERY

## 2021-04-05 NOTE — PROGRESS NOTES
Office Visit Note       Active Problems      1. Right inguinal hernia    2.  Follicular lymphoma of intra-abdominal lymph nodes, unspecified grade [facility])          Chief Complaint   Patient presents with:  Hernia        History of Present Illness  Kathe Kruse ROBOT-ASSISTED LAPAROSCOPIC EXCISION OF A MASS N/A 10/18/2019    Performed by Essie Rebollar MD at [facility] MAIN OR       The family history and social history have been reviewed by me today.     Family History   Problem Relation Age of Onset   • Cancer Father intervention were explained to the patient in detail including, but not limited to, bleeding, infection, recurrence, nondiagnostic yield, incorrect diagnosis, prolonged postoperative pain, urinary retention, injury to adjacent organs and structures, injury

## 2021-04-08 ENCOUNTER — PATIENT MESSAGE (OUTPATIENT)
Dept: FAMILY MEDICINE CLINIC | Facility: CLINIC | Age: 71
End: 2021-04-08

## 2021-04-08 NOTE — TELEPHONE ENCOUNTER
From: Irma Epps  To: Bianca Edwards MD  Sent: 4/8/2021 12:10 PM CDT  Subject: Referral Request    Dr. Deuce Lucero , as you know by now I have accepted hernia surgery to be performed.  However, I have  serious concerns of Dr. Rosa Maria Peterson' patients that have given nega

## 2021-04-09 NOTE — TELEPHONE ENCOUNTER
I tried to call patient let him know okay to proceed with the surgery per Dr. Addis Marshall, went to .

## 2021-04-13 ENCOUNTER — LAB ENCOUNTER (OUTPATIENT)
Dept: LAB | Age: 71
End: 2021-04-13
Attending: COLON & RECTAL SURGERY
Payer: MEDICARE

## 2021-04-13 DIAGNOSIS — K40.90 RIGHT INGUINAL HERNIA: ICD-10-CM

## 2021-04-16 ENCOUNTER — HOSPITAL ENCOUNTER (OUTPATIENT)
Facility: HOSPITAL | Age: 71
Setting detail: HOSPITAL OUTPATIENT SURGERY
Discharge: HOME | End: 2021-04-16
Attending: COLON & RECTAL SURGERY | Admitting: COLON & RECTAL SURGERY
Payer: MEDICARE

## 2021-04-16 ENCOUNTER — ANESTHESIA (OUTPATIENT)
Dept: SURGERY | Facility: HOSPITAL | Age: 71
End: 2021-04-16
Payer: MEDICARE

## 2021-04-16 ENCOUNTER — ANESTHESIA EVENT (OUTPATIENT)
Dept: SURGERY | Facility: HOSPITAL | Age: 71
End: 2021-04-16
Payer: MEDICARE

## 2021-04-16 VITALS
RESPIRATION RATE: 18 BRPM | BODY MASS INDEX: 22.42 KG/M2 | HEART RATE: 57 BPM | TEMPERATURE: 97 F | DIASTOLIC BLOOD PRESSURE: 61 MMHG | SYSTOLIC BLOOD PRESSURE: 126 MMHG | OXYGEN SATURATION: 100 % | WEIGHT: 156.63 LBS | HEIGHT: 70 IN

## 2021-04-16 DIAGNOSIS — K40.90 RIGHT INGUINAL HERNIA: Primary | ICD-10-CM

## 2021-04-16 PROCEDURE — 0YU54JZ SUPPLEMENT RIGHT INGUINAL REGION WITH SYNTHETIC SUBSTITUTE, PERCUTANEOUS ENDOSCOPIC APPROACH: ICD-10-PCS | Performed by: COLON & RECTAL SURGERY

## 2021-04-16 PROCEDURE — 8E0W4CZ ROBOTIC ASSISTED PROCEDURE OF TRUNK REGION, PERCUTANEOUS ENDOSCOPIC APPROACH: ICD-10-PCS | Performed by: COLON & RECTAL SURGERY

## 2021-04-16 DEVICE — PROGRIP MESH: Type: IMPLANTABLE DEVICE | Site: INGUINAL | Status: FUNCTIONAL

## 2021-04-16 RX ORDER — GLYCOPYRROLATE 0.2 MG/ML
INJECTION, SOLUTION INTRAMUSCULAR; INTRAVENOUS AS NEEDED
Status: DISCONTINUED | OUTPATIENT
Start: 2021-04-16 | End: 2021-04-16 | Stop reason: SURG

## 2021-04-16 RX ORDER — HYDROMORPHONE HYDROCHLORIDE 1 MG/ML
0.4 INJECTION, SOLUTION INTRAMUSCULAR; INTRAVENOUS; SUBCUTANEOUS EVERY 5 MIN PRN
Status: DISCONTINUED | OUTPATIENT
Start: 2021-04-16 | End: 2021-04-16

## 2021-04-16 RX ORDER — HEPARIN SODIUM 5000 [USP'U]/ML
5000 INJECTION, SOLUTION INTRAVENOUS; SUBCUTANEOUS ONCE
Status: COMPLETED | OUTPATIENT
Start: 2021-04-16 | End: 2021-04-16

## 2021-04-16 RX ORDER — ACETAMINOPHEN 500 MG
1000 TABLET ORAL ONCE AS NEEDED
Status: DISCONTINUED | OUTPATIENT
Start: 2021-04-16 | End: 2021-04-16

## 2021-04-16 RX ORDER — DEXAMETHASONE SODIUM PHOSPHATE 4 MG/ML
VIAL (ML) INJECTION AS NEEDED
Status: DISCONTINUED | OUTPATIENT
Start: 2021-04-16 | End: 2021-04-16 | Stop reason: SURG

## 2021-04-16 RX ORDER — NEOSTIGMINE METHYLSULFATE 1 MG/ML
INJECTION INTRAVENOUS AS NEEDED
Status: DISCONTINUED | OUTPATIENT
Start: 2021-04-16 | End: 2021-04-16 | Stop reason: SURG

## 2021-04-16 RX ORDER — NALOXONE HYDROCHLORIDE 0.4 MG/ML
80 INJECTION, SOLUTION INTRAMUSCULAR; INTRAVENOUS; SUBCUTANEOUS AS NEEDED
Status: DISCONTINUED | OUTPATIENT
Start: 2021-04-16 | End: 2021-04-16

## 2021-04-16 RX ORDER — ONDANSETRON 2 MG/ML
INJECTION INTRAMUSCULAR; INTRAVENOUS AS NEEDED
Status: DISCONTINUED | OUTPATIENT
Start: 2021-04-16 | End: 2021-04-16 | Stop reason: SURG

## 2021-04-16 RX ORDER — EPHEDRINE SULFATE 50 MG/ML
INJECTION INTRAVENOUS AS NEEDED
Status: DISCONTINUED | OUTPATIENT
Start: 2021-04-16 | End: 2021-04-16 | Stop reason: SURG

## 2021-04-16 RX ORDER — INDOCYANINE GREEN AND WATER 25 MG
2.5 KIT INJECTION ONCE
Status: COMPLETED | OUTPATIENT
Start: 2021-04-16 | End: 2021-04-16

## 2021-04-16 RX ORDER — HYDROMORPHONE HYDROCHLORIDE 1 MG/ML
INJECTION, SOLUTION INTRAMUSCULAR; INTRAVENOUS; SUBCUTANEOUS
Status: COMPLETED
Start: 2021-04-16 | End: 2021-04-16

## 2021-04-16 RX ORDER — HYDROCODONE BITARTRATE AND ACETAMINOPHEN 5; 325 MG/1; MG/1
1 TABLET ORAL AS NEEDED
Status: DISCONTINUED | OUTPATIENT
Start: 2021-04-16 | End: 2021-04-16

## 2021-04-16 RX ORDER — ACETAMINOPHEN 500 MG
500 TABLET ORAL EVERY 6 HOURS PRN
COMMUNITY

## 2021-04-16 RX ORDER — ROCURONIUM BROMIDE 10 MG/ML
INJECTION, SOLUTION INTRAVENOUS AS NEEDED
Status: DISCONTINUED | OUTPATIENT
Start: 2021-04-16 | End: 2021-04-16 | Stop reason: SURG

## 2021-04-16 RX ORDER — SODIUM CHLORIDE, SODIUM LACTATE, POTASSIUM CHLORIDE, CALCIUM CHLORIDE 600; 310; 30; 20 MG/100ML; MG/100ML; MG/100ML; MG/100ML
INJECTION, SOLUTION INTRAVENOUS CONTINUOUS
Status: DISCONTINUED | OUTPATIENT
Start: 2021-04-16 | End: 2021-04-16

## 2021-04-16 RX ORDER — LIDOCAINE HYDROCHLORIDE 10 MG/ML
INJECTION, SOLUTION EPIDURAL; INFILTRATION; INTRACAUDAL; PERINEURAL AS NEEDED
Status: DISCONTINUED | OUTPATIENT
Start: 2021-04-16 | End: 2021-04-16 | Stop reason: SURG

## 2021-04-16 RX ORDER — METOCLOPRAMIDE HYDROCHLORIDE 5 MG/ML
10 INJECTION INTRAMUSCULAR; INTRAVENOUS AS NEEDED
Status: DISCONTINUED | OUTPATIENT
Start: 2021-04-16 | End: 2021-04-16

## 2021-04-16 RX ORDER — HYDROCODONE BITARTRATE AND ACETAMINOPHEN 5; 325 MG/1; MG/1
2 TABLET ORAL AS NEEDED
Status: DISCONTINUED | OUTPATIENT
Start: 2021-04-16 | End: 2021-04-16

## 2021-04-16 RX ORDER — OXYCODONE HYDROCHLORIDE 5 MG/1
5 TABLET ORAL EVERY 4 HOURS PRN
Qty: 10 TABLET | Refills: 0 | Status: SHIPPED | OUTPATIENT
Start: 2021-04-16

## 2021-04-16 RX ORDER — ONDANSETRON 2 MG/ML
4 INJECTION INTRAMUSCULAR; INTRAVENOUS AS NEEDED
Status: DISCONTINUED | OUTPATIENT
Start: 2021-04-16 | End: 2021-04-16

## 2021-04-16 RX ORDER — ACETAMINOPHEN 500 MG
1000 TABLET ORAL ONCE
Status: DISCONTINUED | OUTPATIENT
Start: 2021-04-16 | End: 2021-04-16

## 2021-04-16 RX ORDER — BUPIVACAINE HYDROCHLORIDE AND EPINEPHRINE 2.5; 5 MG/ML; UG/ML
INJECTION, SOLUTION EPIDURAL; INFILTRATION; INTRACAUDAL; PERINEURAL AS NEEDED
Status: DISCONTINUED | OUTPATIENT
Start: 2021-04-16 | End: 2021-04-16 | Stop reason: HOSPADM

## 2021-04-16 RX ORDER — CEFAZOLIN SODIUM/WATER 2 G/20 ML
2 SYRINGE (ML) INTRAVENOUS ONCE
Status: COMPLETED | OUTPATIENT
Start: 2021-04-16 | End: 2021-04-16

## 2021-04-16 RX ORDER — KETOROLAC TROMETHAMINE 30 MG/ML
INJECTION, SOLUTION INTRAMUSCULAR; INTRAVENOUS AS NEEDED
Status: DISCONTINUED | OUTPATIENT
Start: 2021-04-16 | End: 2021-04-16 | Stop reason: SURG

## 2021-04-16 RX ADMIN — GLYCOPYRROLATE 0.2 MG: 0.2 INJECTION, SOLUTION INTRAMUSCULAR; INTRAVENOUS at 15:56:00

## 2021-04-16 RX ADMIN — KETOROLAC TROMETHAMINE 15 MG: 30 INJECTION, SOLUTION INTRAMUSCULAR; INTRAVENOUS at 16:54:00

## 2021-04-16 RX ADMIN — LIDOCAINE HYDROCHLORIDE 50 MG: 10 INJECTION, SOLUTION EPIDURAL; INFILTRATION; INTRACAUDAL; PERINEURAL at 15:20:00

## 2021-04-16 RX ADMIN — ROCURONIUM BROMIDE 40 MG: 10 INJECTION, SOLUTION INTRAVENOUS at 15:20:00

## 2021-04-16 RX ADMIN — CEFAZOLIN SODIUM/WATER 2 G: 2 G/20 ML SYRINGE (ML) INTRAVENOUS at 15:28:00

## 2021-04-16 RX ADMIN — EPHEDRINE SULFATE 10 MG: 50 INJECTION INTRAVENOUS at 15:53:00

## 2021-04-16 RX ADMIN — EPHEDRINE SULFATE 10 MG: 50 INJECTION INTRAVENOUS at 15:40:00

## 2021-04-16 RX ADMIN — NEOSTIGMINE METHYLSULFATE 4 MG: 1 INJECTION INTRAVENOUS at 17:02:00

## 2021-04-16 RX ADMIN — GLYCOPYRROLATE 0.6 MG: 0.2 INJECTION, SOLUTION INTRAMUSCULAR; INTRAVENOUS at 17:02:00

## 2021-04-16 RX ADMIN — SODIUM CHLORIDE, SODIUM LACTATE, POTASSIUM CHLORIDE, CALCIUM CHLORIDE: 600; 310; 30; 20 INJECTION, SOLUTION INTRAVENOUS at 15:16:00

## 2021-04-16 RX ADMIN — DEXAMETHASONE SODIUM PHOSPHATE 4 MG: 4 MG/ML VIAL (ML) INJECTION at 15:20:00

## 2021-04-16 RX ADMIN — ROCURONIUM BROMIDE 5 MG: 10 INJECTION, SOLUTION INTRAVENOUS at 16:45:00

## 2021-04-16 RX ADMIN — ONDANSETRON 4 MG: 2 INJECTION INTRAMUSCULAR; INTRAVENOUS at 16:54:00

## 2021-04-16 RX ADMIN — EPHEDRINE SULFATE 7.5 MG: 50 INJECTION INTRAVENOUS at 15:43:00

## 2021-04-16 NOTE — OPERATIVE REPORT
[facility]  Operative Note    Cecille Jointer Location: OR   CSN [id number] MRN [id number]    1950 Age 65year old   Admission Date 2021 Operation Date 2021   Attending Physician Iam Lew MD Operating Physician Meeta Louis Diagnostic survey of the abdomen revealed no other acute pathology or iatrogenic injury. The inguinal region was examined and the hernia defect was identified.  Two 8-mm trocars were placed on either side of the abdomen in the midclavicular line under direc abdominis muscle. The peritoneal flap was then reapproximated using running 3-0 V-Loc suture. All instruments were removed and the robot undocked.  0.25% Marcaine with epinephrine was used to perform a bilateral TAP block under direct laparoscopic visua

## 2021-04-16 NOTE — ANESTHESIA POSTPROCEDURE EVALUATION
[address] [facility] Patient Status:  Hospital Outpatient Surgery   Age/Gender 65year old male MRN [id number]   [facility] SURGERY Attending Alena Glass MD   Hosp Day # 0 PCP Nima Peterson MD       Anesthesia Post-op Note

## 2021-04-16 NOTE — ANESTHESIA PROCEDURE NOTES
Airway  Date/Time: 4/16/2021 3:24 PM  Urgency: elective    Airway not difficult    General Information and Staff    Patient location during procedure: OR  Anesthesiologist: Jola Boxer, MD  Resident/CRNA: Henrique San CRNA  Performed: DILIP Alves

## 2021-04-16 NOTE — ANESTHESIA PREPROCEDURE EVALUATION
PRE-OP EVALUATION    Patient Name: Leah Mckinnon    Admit Diagnosis: Right inguinal hernia [K40.90]    Pre-op Diagnosis: Right inguinal hernia [K40.90]    XI ROBOT-ASSISTED RIGHT LAPAROSCOPIC INGUINAL HERNIA REPAIR WITH MESH, POSSIBLE OPEN    Anesthesia Proc lymphadenopathy, follicular CA      Past Surgical History:   Procedure Laterality Date   • APPENDECTOMY     • APPENDECTOMY     • COLONOSCOPY     • COLONOSCOPY  2014   • HIP REPLACEMENT SURGERY Right 2017   • OTHER SURGICAL HISTORY  10/18/2019    Laparoscop Admission:  **None**

## 2021-04-16 NOTE — H&P
See note below. The patient presents for right inguinal hernia repair. He agrees to proceed with left inguinal hernia repair of one is identified intraoperatively. All questions answered. Proceed with surgery.     Alexx Bryan MD  EMG TONSILLECTOMY  grammar elvin. years    childhood   • TOTAL HIP REPLACEMENT Right        The family history and social history have been reviewed by me today.     Family History   Problem Relation Age of Onset   • Cancer Father         prostate   • Heart Disor patient in detail including, but not limited to, bleeding, infection, recurrence, nondiagnostic yield, incorrect diagnosis, prolonged postoperative pain, urinary retention, injury to adjacent organs and structures, injury to the ilioinguinal and iliohypoga

## 2021-04-26 ENCOUNTER — OFFICE VISIT (OUTPATIENT)
Dept: SURGERY | Facility: CLINIC | Age: 71
End: 2021-04-26

## 2021-04-26 VITALS
TEMPERATURE: 97 F | WEIGHT: 156.38 LBS | SYSTOLIC BLOOD PRESSURE: 131 MMHG | HEIGHT: 70 IN | HEART RATE: 62 BPM | BODY MASS INDEX: 22.39 KG/M2 | DIASTOLIC BLOOD PRESSURE: 76 MMHG

## 2021-04-26 DIAGNOSIS — Z87.19 S/P RIGHT INGUINAL HERNIA REPAIR: Primary | ICD-10-CM

## 2021-04-26 DIAGNOSIS — Z98.890 S/P RIGHT INGUINAL HERNIA REPAIR: Primary | ICD-10-CM

## 2021-04-26 PROCEDURE — 3075F SYST BP GE 130 - 139MM HG: CPT | Performed by: STUDENT IN AN ORGANIZED HEALTH CARE EDUCATION/TRAINING PROGRAM

## 2021-04-26 PROCEDURE — 3008F BODY MASS INDEX DOCD: CPT | Performed by: STUDENT IN AN ORGANIZED HEALTH CARE EDUCATION/TRAINING PROGRAM

## 2021-04-26 PROCEDURE — 99024 POSTOP FOLLOW-UP VISIT: CPT | Performed by: STUDENT IN AN ORGANIZED HEALTH CARE EDUCATION/TRAINING PROGRAM

## 2021-04-26 PROCEDURE — 3078F DIAST BP <80 MM HG: CPT | Performed by: STUDENT IN AN ORGANIZED HEALTH CARE EDUCATION/TRAINING PROGRAM

## 2021-04-26 NOTE — PROGRESS NOTES
Post Operative Visit Note       Active Problems  1. S/P right inguinal hernia repair         Chief Complaint   Patient presents with:  Hernia: 4/16 PO right inguinal hernia - c/o Pt states less degree of low bilateral abdominal pain at a 2/10.   No bleeding APPENDECTOMY     • COLONOSCOPY     • COLONOSCOPY  2014   • HIP REPLACEMENT SURGERY Right 2017   • OTHER SURGICAL HISTORY  10/18/2019    Laparoscopic robotic-assisted mesenteric and retroperitoneal lymph node biopsies   • [facility]  [facility] Cap, Take 1,000 mg by mouth daily. , Disp: , Rfl:   •  Vitamin B-12 100 MCG Oral Tab, Take 250 mcg by mouth daily. , Disp: , Rfl:       Review of Systems  The Review of Systems has been reviewed by me during today.   Review of Systems   Constitutional: Kwabena Doss There is no distension. Palpations: Abdomen is soft. There is no mass. Tenderness: There is no abdominal tenderness. There is no guarding or rebound. Hernia: No hernia is present. Comments: The abdomen is soft and nontender.   No bianca

## 2021-05-17 ENCOUNTER — TELEPHONE (OUTPATIENT)
Dept: HEMATOLOGY/ONCOLOGY | Facility: HOSPITAL | Age: 71
End: 2021-05-17

## 2021-05-17 ENCOUNTER — HOSPITAL ENCOUNTER (OUTPATIENT)
Dept: CT IMAGING | Age: 71
Discharge: HOME | End: 2021-05-17
Attending: INTERNAL MEDICINE
Payer: MEDICARE

## 2021-05-17 DIAGNOSIS — C82.93 FOLLICULAR LYMPHOMA OF INTRA-ABDOMINAL LYMPH NODES, UNSPECIFIED GRADE (HCC): ICD-10-CM

## 2021-05-17 PROCEDURE — 71260 CT THORAX DX C+: CPT | Performed by: INTERNAL MEDICINE

## 2021-05-17 PROCEDURE — 82565 ASSAY OF CREATININE: CPT

## 2021-05-17 PROCEDURE — 74177 CT ABD & PELVIS W/CONTRAST: CPT | Performed by: INTERNAL MEDICINE

## 2021-05-17 NOTE — TELEPHONE ENCOUNTER
LVM for patient requesting call back. Contact information provided. Jaja Vital MD  P Edw Bcn Vladimir Rns  Results reviewed. Left mesenteric LN is stable: 1.8 x 1.7 cm, previously 1.5 x 1.3 cm.  Thanks

## 2021-05-18 ENCOUNTER — TELEPHONE (OUTPATIENT)
Dept: HEMATOLOGY/ONCOLOGY | Facility: HOSPITAL | Age: 71
End: 2021-05-18

## 2021-05-18 NOTE — TELEPHONE ENCOUNTER
We received a fax from the answering service. . Patient is sorry he missed Fatmata's call. Patient has asked if [facility] can call him back on Thursday morning at 10:00 a.m. Emperatriz Wiley so he can get his CT scan results.

## 2021-07-16 ENCOUNTER — OFFICE VISIT (OUTPATIENT)
Dept: FAMILY MEDICINE CLINIC | Facility: CLINIC | Age: 71
End: 2021-07-16
Payer: MEDICARE

## 2021-07-16 ENCOUNTER — HOSPITAL ENCOUNTER (OUTPATIENT)
Age: 71
Discharge: HOME | End: 2021-07-16
Attending: EMERGENCY MEDICINE
Payer: MEDICARE

## 2021-07-16 ENCOUNTER — APPOINTMENT (OUTPATIENT)
Dept: GENERAL RADIOLOGY | Age: 71
End: 2021-07-16
Attending: EMERGENCY MEDICINE
Payer: MEDICARE

## 2021-07-16 VITALS
TEMPERATURE: 98 F | SYSTOLIC BLOOD PRESSURE: 162 MMHG | OXYGEN SATURATION: 99 % | RESPIRATION RATE: 16 BRPM | DIASTOLIC BLOOD PRESSURE: 82 MMHG | HEART RATE: 65 BPM

## 2021-07-16 DIAGNOSIS — S16.1XXA STRAIN OF NECK MUSCLE, INITIAL ENCOUNTER: Primary | ICD-10-CM

## 2021-07-16 DIAGNOSIS — Z02.9 ENCOUNTERS FOR ADMINISTRATIVE PURPOSE: Primary | ICD-10-CM

## 2021-07-16 PROCEDURE — 99213 OFFICE O/P EST LOW 20 MIN: CPT

## 2021-07-16 PROCEDURE — 72050 X-RAY EXAM NECK SPINE 4/5VWS: CPT | Performed by: EMERGENCY MEDICINE

## 2021-07-16 RX ORDER — CYCLOBENZAPRINE HCL 10 MG
10 TABLET ORAL 3 TIMES DAILY PRN
Qty: 20 TABLET | Refills: 0 | Status: SHIPPED | OUTPATIENT
Start: 2021-07-16 | End: 2021-07-23

## 2021-07-16 NOTE — ED PROVIDER NOTES
Patient Seen in: [facility]      History   Patient presents with:  Neck Pain    Stated Complaint: neck pain /sent from [facility] /one month    HPI/Subjective:   HPI    Patient is a pleasant 56-year-old male who reports he has had a stiff neck s from [address] [place] [address]  Other systems are as noted in HPI. Constitutional and vital signs reviewed. All other systems reviewed and negative except as noted above.     Physical Exam     ED Triage Vitals [07/16/21 1207]   BP (!) 162/82   Pulse 65   Resp 16 joints. Alignment is normal. DISC SPACES:  There is moderate to severe disc space narrowing at C5-C6 and C6-C7. There is mild disc space narrowing at C7-T1. PARASPINOUS:  Negative. No paraspinous abnormality is seen. OTHER:  Negative.               CONCL

## 2021-07-16 NOTE — PROGRESS NOTES
Neck pain for about 1 month. Feels that pain has been improving, however, states he can \"feel vertebrae move\" when he moves. Pt feels imaging would be appropriate at this time with sx concern. Discussed limitations of WIC and encouraged PCP eval or IC.

## 2021-07-16 NOTE — ED INITIAL ASSESSMENT (HPI)
Neck pain - started 1 month ago, denies injury. At first he cant turn his head to the right, now he is able to turn to the right but is still having stiffness, turning to left side is limited and can hear cracking. Pt wants xray.  He feels his neck is not a

## 2021-08-16 DIAGNOSIS — C82.93 FOLLICULAR LYMPHOMA OF INTRA-ABDOMINAL LYMPH NODES, UNSPECIFIED GRADE (HCC): Primary | ICD-10-CM

## 2021-09-02 ENCOUNTER — HOSPITAL ENCOUNTER (OUTPATIENT)
Dept: CT IMAGING | Age: 71
Discharge: HOME | End: 2021-09-02
Attending: INTERNAL MEDICINE
Payer: MEDICARE

## 2021-09-02 ENCOUNTER — TELEPHONE (OUTPATIENT)
Dept: HEMATOLOGY/ONCOLOGY | Facility: HOSPITAL | Age: 71
End: 2021-09-02

## 2021-09-02 DIAGNOSIS — C82.93 FOLLICULAR LYMPHOMA OF INTRA-ABDOMINAL LYMPH NODES, UNSPECIFIED GRADE (HCC): ICD-10-CM

## 2021-09-02 LAB — CREAT BLD-MCNC: 0.7 MG/DL

## 2021-09-02 PROCEDURE — 74177 CT ABD & PELVIS W/CONTRAST: CPT | Performed by: INTERNAL MEDICINE

## 2021-09-02 PROCEDURE — 71260 CT THORAX DX C+: CPT | Performed by: INTERNAL MEDICINE

## 2021-09-02 PROCEDURE — 82565 ASSAY OF CREATININE: CPT

## 2021-09-02 NOTE — TELEPHONE ENCOUNTER
Warden Guille MD  P Edw India Gilbert Rns  Results reviewed. CT shows slight enlargement. He is due to see me this month or next month.  Thanks     Home Phone      [phone]   Work Phone     [phone]   Mobile          [phone]     Best number to eliana

## 2021-09-08 NOTE — PROGRESS NOTES
THE [facility] Hematology and Oncology Clinic Note    Diagnosis:   1. Stage I Low Grade Follicular Lymphoma  2.  FLIPI Score 1     Treatment History: None     Visit Diagnosis:  Follicular lymphoma of intra-abdominal lymph nodes, unspecified grade ([address]  (primary en · 3/13/20: CT AP: Several mesenteric nodes have decreased, there was one new LN measuring in 1.5 cm in the mesentery   · 3/18/20: He met with Dr. Armani Thomas in Radiation Oncology: He was offered ISRT and Observation and opted for observation.   · 12/12/20: CT CAP Follicular Lymphoma   • Hearing impairment     [place] bilateral hearing aids   • Hernia, inguinal    • Problems with swallowing     chokes easily-needs to drink/eat slowly   • Sciatica of right side    • Vertigo    • Visual impairment     glasses     Past Claudia breathing  Abd: soft nt nd +BS no hepatosplenomegaly  Neuro: CN: II-XII grossly intact    Results:  Lab Results   Component Value Date    WBC 5.2 09/09/2021    HGB 12.9 (L) 09/09/2021    HCT 37.3 (L) 09/09/2021    MCV 89.9 09/09/2021    .0 09/09/202 Cardiac function:Asymtomatic   - Renal Function:normal  - Hepatic function:normal  - VTE prophylaxis:n/a  - ID Treatment/Prophylaxis:n/a  - Follow up: RTC in 6 months     [facility] Hematology and Oncology [facility]

## 2021-09-09 ENCOUNTER — OFFICE VISIT (OUTPATIENT)
Dept: HEMATOLOGY/ONCOLOGY | Facility: HOSPITAL | Age: 71
End: 2021-09-09
Attending: INTERNAL MEDICINE
Payer: MEDICARE

## 2021-09-09 VITALS
SYSTOLIC BLOOD PRESSURE: 148 MMHG | HEART RATE: 71 BPM | WEIGHT: 157.5 LBS | RESPIRATION RATE: 16 BRPM | DIASTOLIC BLOOD PRESSURE: 75 MMHG | OXYGEN SATURATION: 98 % | TEMPERATURE: 98 F | BODY MASS INDEX: 23 KG/M2

## 2021-09-09 DIAGNOSIS — C82.93 FOLLICULAR LYMPHOMA OF INTRA-ABDOMINAL LYMPH NODES, UNSPECIFIED GRADE (HCC): Primary | ICD-10-CM

## 2021-09-09 LAB
ALBUMIN SERPL-MCNC: 3.5 G/DL (ref 3.4–5)
ALBUMIN/GLOB SERPL: 1 {RATIO} (ref 1–2)
ALP LIVER SERPL-CCNC: 65 U/L
ALT SERPL-CCNC: 36 U/L
ANION GAP SERPL CALC-SCNC: 6 MMOL/L (ref 0–18)
AST SERPL-CCNC: 23 U/L (ref 15–37)
BASOPHILS # BLD AUTO: 0.05 X10(3) UL (ref 0–0.2)
BASOPHILS NFR BLD AUTO: 1 %
BILIRUB SERPL-MCNC: 0.4 MG/DL (ref 0.1–2)
BUN BLD-MCNC: 15 MG/DL (ref 7–18)
CALCIUM BLD-MCNC: 9.1 MG/DL (ref 8.5–10.1)
CHLORIDE SERPL-SCNC: 108 MMOL/L (ref 98–112)
CO2 SERPL-SCNC: 25 MMOL/L (ref 21–32)
CREAT BLD-MCNC: 0.73 MG/DL
EOSINOPHIL # BLD AUTO: 0.14 X10(3) UL (ref 0–0.7)
EOSINOPHIL NFR BLD AUTO: 2.7 %
ERYTHROCYTE [DISTWIDTH] IN BLOOD BY AUTOMATED COUNT: 12.3 %
GLOBULIN PLAS-MCNC: 3.4 G/DL (ref 2.8–4.4)
GLUCOSE BLD-MCNC: 121 MG/DL (ref 70–99)
HCT VFR BLD AUTO: 37.3 %
HGB BLD-MCNC: 12.9 G/DL
IMM GRANULOCYTES # BLD AUTO: 0.01 X10(3) UL (ref 0–1)
IMM GRANULOCYTES NFR BLD: 0.2 %
LDH SERPL L TO P-CCNC: 150 U/L
LYMPHOCYTES # BLD AUTO: 1.77 X10(3) UL (ref 1–4)
LYMPHOCYTES NFR BLD AUTO: 34.1 %
M PROTEIN MFR SERPL ELPH: 6.9 G/DL (ref 6.4–8.2)
MCH RBC QN AUTO: 31.1 PG (ref 26–34)
MCHC RBC AUTO-ENTMCNC: 34.6 G/DL (ref 31–37)
MCV RBC AUTO: 89.9 FL
MONOCYTES # BLD AUTO: 0.48 X10(3) UL (ref 0.1–1)
MONOCYTES NFR BLD AUTO: 9.2 %
NEUTROPHILS # BLD AUTO: 2.74 X10 (3) UL (ref 1.5–7.7)
NEUTROPHILS # BLD AUTO: 2.74 X10(3) UL (ref 1.5–7.7)
NEUTROPHILS NFR BLD AUTO: 52.8 %
OSMOLALITY SERPL CALC.SUM OF ELEC: 290 MOSM/KG (ref 275–295)
PLATELET # BLD AUTO: 250 10(3)UL (ref 150–450)
POTASSIUM SERPL-SCNC: 3.9 MMOL/L (ref 3.5–5.1)
RBC # BLD AUTO: 4.15 X10(6)UL
SODIUM SERPL-SCNC: 139 MMOL/L (ref 136–145)
VIT B12 SERPL-MCNC: 372 PG/ML (ref 193–986)
WBC # BLD AUTO: 5.2 X10(3) UL (ref 4–11)

## 2021-09-09 PROCEDURE — 99214 OFFICE O/P EST MOD 30 MIN: CPT | Performed by: INTERNAL MEDICINE

## 2022-03-10 ENCOUNTER — APPOINTMENT (OUTPATIENT)
Dept: HEMATOLOGY/ONCOLOGY | Facility: HOSPITAL | Age: 72
End: 2022-03-10
Attending: INTERNAL MEDICINE
Payer: MEDICARE

## 2022-03-22 LAB — AMB EXT COVID-19 RESULT: DETECTED

## 2022-03-24 LAB — AMB EXT COVID-19 RESULT: DETECTED

## 2022-03-28 ENCOUNTER — OFFICE VISIT (OUTPATIENT)
Dept: FAMILY MEDICINE CLINIC | Facility: CLINIC | Age: 72
End: 2022-03-28
Payer: MEDICARE

## 2022-03-28 DIAGNOSIS — U07.1 COVID-19: Primary | ICD-10-CM

## 2022-03-28 NOTE — PROGRESS NOTES
Phone triaged patient, took 2 tests this past week after congestion and fever started last Tuesday, that were positive. Pt reports for the past two days he has been fever free and feeling well. Pt has received Covid vaccines. Pt does not need a lab test for Covid 19 as he is retired. Pt advised to follow up PRN for any symptoms at this time.

## 2022-03-31 ENCOUNTER — PATIENT MESSAGE (OUTPATIENT)
Dept: FAMILY MEDICINE CLINIC | Facility: CLINIC | Age: 72
End: 2022-03-31

## 2022-03-31 NOTE — TELEPHONE ENCOUNTER
From: Luther Barron  To:  Brandon Hyman MD  Sent: 3/31/2022 12:59 PM CDT  Subject: Covid-19    Update on Covid-19,   1st Covid-19 dose on 8/30/21 Pfizer   2nd Covid-19 dose on 9/24/21 [place]    Contracted on Mar. 22, 2022 & verified home   test kit, positive on Mar. 24, 2022

## 2022-03-31 NOTE — TELEPHONE ENCOUNTER
From: John Staton  To:  Juanito Blackmon MD  Sent: 3/31/2022 12:59 PM CDT  Subject: Covid-19    Update on Covid-19,   1st Covid-19 dose on 8/30/21 Pfizer   2nd Covid-19 dose on 9/24/21 Meir Chau    Contracted on Mar. 22, 2022 & verified home   test kit, positive on Mar. 24, 2022

## 2022-04-04 ENCOUNTER — APPOINTMENT (OUTPATIENT)
Dept: HEMATOLOGY/ONCOLOGY | Age: 72
End: 2022-04-04
Attending: INTERNAL MEDICINE
Payer: MEDICARE

## 2022-04-04 ENCOUNTER — OFFICE VISIT (OUTPATIENT)
Dept: FAMILY MEDICINE CLINIC | Facility: CLINIC | Age: 72
End: 2022-04-04
Payer: MEDICARE

## 2022-04-04 VITALS
RESPIRATION RATE: 16 BRPM | HEIGHT: 70 IN | DIASTOLIC BLOOD PRESSURE: 82 MMHG | SYSTOLIC BLOOD PRESSURE: 120 MMHG | BODY MASS INDEX: 23.05 KG/M2 | WEIGHT: 161 LBS | HEART RATE: 63 BPM | OXYGEN SATURATION: 98 %

## 2022-04-04 DIAGNOSIS — U07.1 COVID-19: Primary | ICD-10-CM

## 2022-04-04 DIAGNOSIS — E78.6 LOW HDL (UNDER 40): ICD-10-CM

## 2022-04-04 DIAGNOSIS — N40.0 BENIGN NON-NODULAR PROSTATIC HYPERPLASIA WITHOUT LOWER URINARY TRACT SYMPTOMS: ICD-10-CM

## 2022-04-04 PROBLEM — J41.0 SMOKERS' COUGH (HCC): Chronic | Status: ACTIVE | Noted: 2022-04-04

## 2022-04-04 PROCEDURE — 99214 OFFICE O/P EST MOD 30 MIN: CPT | Performed by: FAMILY MEDICINE

## 2022-04-04 PROCEDURE — 3079F DIAST BP 80-89 MM HG: CPT | Performed by: FAMILY MEDICINE

## 2022-04-04 PROCEDURE — 3074F SYST BP LT 130 MM HG: CPT | Performed by: FAMILY MEDICINE

## 2022-04-04 PROCEDURE — 3008F BODY MASS INDEX DOCD: CPT | Performed by: FAMILY MEDICINE

## 2022-04-07 PROBLEM — R59.0 MESENTERIC LYMPHADENOPATHY: Status: RESOLVED | Noted: 2019-09-30 | Resolved: 2022-04-07

## 2022-08-05 DIAGNOSIS — C85.90 LYMPHOMA, UNSPECIFIED BODY REGION, UNSPECIFIED LYMPHOMA TYPE (HCC): Primary | ICD-10-CM

## 2022-08-11 ENCOUNTER — HOSPITAL ENCOUNTER (OUTPATIENT)
Dept: CT IMAGING | Age: 72
Discharge: HOME | End: 2022-08-11
Attending: INTERNAL MEDICINE
Payer: MEDICARE

## 2022-08-11 DIAGNOSIS — C85.90 LYMPHOMA, UNSPECIFIED BODY REGION, UNSPECIFIED LYMPHOMA TYPE (HCC): ICD-10-CM

## 2022-08-11 PROCEDURE — 74177 CT ABD & PELVIS W/CONTRAST: CPT | Performed by: INTERNAL MEDICINE

## 2022-08-11 PROCEDURE — 82565 ASSAY OF CREATININE: CPT

## 2022-08-12 LAB
CREAT BLD-MCNC: 0.8 MG/DL
GFR SERPLBLD BASED ON 1.73 SQ M-ARVRAT: 93 ML/MIN/1.73M2 (ref 60–?)

## 2022-08-15 ENCOUNTER — OFFICE VISIT (OUTPATIENT)
Dept: HEMATOLOGY/ONCOLOGY | Age: 72
End: 2022-08-15
Attending: INTERNAL MEDICINE
Payer: MEDICARE

## 2022-08-15 VITALS
OXYGEN SATURATION: 99 % | WEIGHT: 164.31 LBS | SYSTOLIC BLOOD PRESSURE: 156 MMHG | DIASTOLIC BLOOD PRESSURE: 73 MMHG | RESPIRATION RATE: 18 BRPM | HEART RATE: 62 BPM | TEMPERATURE: 97 F | BODY MASS INDEX: 24 KG/M2

## 2022-08-15 DIAGNOSIS — C82.93 FOLLICULAR LYMPHOMA OF INTRA-ABDOMINAL LYMPH NODES, UNSPECIFIED GRADE (HCC): Primary | ICD-10-CM

## 2022-08-15 LAB
ALBUMIN SERPL-MCNC: 3.7 G/DL (ref 3.4–5)
ALBUMIN/GLOB SERPL: 1.2 {RATIO} (ref 1–2)
ALP LIVER SERPL-CCNC: 58 U/L
ALT SERPL-CCNC: 28 U/L
ANION GAP SERPL CALC-SCNC: 2 MMOL/L (ref 0–18)
AST SERPL-CCNC: 19 U/L (ref 15–37)
BASOPHILS # BLD AUTO: 0.05 X10(3) UL (ref 0–0.2)
BASOPHILS NFR BLD AUTO: 1.2 %
BILIRUB SERPL-MCNC: 0.3 MG/DL (ref 0.1–2)
BUN BLD-MCNC: 11 MG/DL (ref 7–18)
CALCIUM BLD-MCNC: 9.3 MG/DL (ref 8.5–10.1)
CHLORIDE SERPL-SCNC: 108 MMOL/L (ref 98–112)
CO2 SERPL-SCNC: 29 MMOL/L (ref 21–32)
CREAT BLD-MCNC: 0.78 MG/DL
DEPRECATED HBV CORE AB SER IA-ACNC: 215.6 NG/ML
EOSINOPHIL # BLD AUTO: 0.11 X10(3) UL (ref 0–0.7)
EOSINOPHIL NFR BLD AUTO: 2.7 %
ERYTHROCYTE [DISTWIDTH] IN BLOOD BY AUTOMATED COUNT: 12.3 %
FASTING STATUS PATIENT QL REPORTED: NO
GFR SERPLBLD BASED ON 1.73 SQ M-ARVRAT: 94 ML/MIN/1.73M2 (ref 60–?)
GLOBULIN PLAS-MCNC: 3.1 G/DL (ref 2.8–4.4)
GLUCOSE BLD-MCNC: 111 MG/DL (ref 70–99)
HCT VFR BLD AUTO: 40 %
HGB BLD-MCNC: 13.5 G/DL
IMM GRANULOCYTES # BLD AUTO: 0.01 X10(3) UL (ref 0–1)
IMM GRANULOCYTES NFR BLD: 0.2 %
IRON SATN MFR SERPL: 23 %
IRON SERPL-MCNC: 82 UG/DL
LDH SERPL L TO P-CCNC: 151 U/L
LYMPHOCYTES # BLD AUTO: 1.63 X10(3) UL (ref 1–4)
LYMPHOCYTES NFR BLD AUTO: 40.4 %
MCH RBC QN AUTO: 30.3 PG (ref 26–34)
MCHC RBC AUTO-ENTMCNC: 33.8 G/DL (ref 31–37)
MCV RBC AUTO: 89.9 FL
MONOCYTES # BLD AUTO: 0.42 X10(3) UL (ref 0.1–1)
MONOCYTES NFR BLD AUTO: 10.4 %
NEUTROPHILS # BLD AUTO: 1.81 X10 (3) UL (ref 1.5–7.7)
NEUTROPHILS # BLD AUTO: 1.81 X10(3) UL (ref 1.5–7.7)
NEUTROPHILS NFR BLD AUTO: 45.1 %
OSMOLALITY SERPL CALC.SUM OF ELEC: 288 MOSM/KG (ref 275–295)
PLATELET # BLD AUTO: 269 10(3)UL (ref 150–450)
POTASSIUM SERPL-SCNC: 4.3 MMOL/L (ref 3.5–5.1)
PROT SERPL-MCNC: 6.8 G/DL (ref 6.4–8.2)
RBC # BLD AUTO: 4.45 X10(6)UL
SODIUM SERPL-SCNC: 139 MMOL/L (ref 136–145)
TIBC SERPL-MCNC: 361 UG/DL (ref 240–450)
TRANSFERRIN SERPL-MCNC: 242 MG/DL (ref 200–360)
VIT B12 SERPL-MCNC: 381 PG/ML (ref 193–986)
VIT D+METAB SERPL-MCNC: 34.2 NG/ML (ref 30–100)
WBC # BLD AUTO: 4 X10(3) UL (ref 4–11)

## 2022-08-15 PROCEDURE — 99214 OFFICE O/P EST MOD 30 MIN: CPT | Performed by: INTERNAL MEDICINE

## 2022-08-15 NOTE — PROGRESS NOTES
Outpatient Oncology Care Plan   Problem list:   fatigue   Problems related to:   self care   Interventions:   provided general teaching   Expected outcomes:   understands plan of care   Progress towards outcome: making progress     Education Record   Learner: Patient   Barriers / Limitations: None   Method: Discussion   Outcome: Shows understanding   Comments:  Patient here for follow-up. Had CT scan performed last week. States energy levels are low. Not taking oral iron as often as previously. Denies any additional symptoms at this time.

## 2022-09-09 NOTE — PROGRESS NOTES
Chief Complaint:   Patient presents with:  Hernia: discomfort when working out   Surgical Followup: discuss right hip surgery     HPI:   This is a 69year old male presenting for follow up, had hip surgery this past winter and reports pain along right hip, HENT: Negative for congestion, ear pain, facial swelling, postnasal drip, rhinorrhea, sinus pressure, sore throat and voice change. Eyes: Negative for photophobia, pain, discharge, redness, itching and visual disturbance.    Respiratory: Negative for c normal.   Mouth/Throat: Oropharynx is clear and moist. No oropharyngeal exudate or pharynx erythema. Eyes: Conjunctivae and EOM are normal. Pupils are equal, round, and reactive to light. Right eye exhibits no discharge. Left eye exhibits no discharge. Agitated combative

## 2022-09-12 ENCOUNTER — APPOINTMENT (OUTPATIENT)
Dept: HEMATOLOGY/ONCOLOGY | Age: 72
End: 2022-09-12
Attending: INTERNAL MEDICINE
Payer: MEDICARE

## 2022-12-09 ENCOUNTER — TELEPHONE (OUTPATIENT)
Dept: FAMILY MEDICINE CLINIC | Facility: CLINIC | Age: 72
End: 2022-12-09

## 2022-12-09 DIAGNOSIS — Z12.5 SCREENING FOR MALIGNANT NEOPLASM OF PROSTATE: Primary | ICD-10-CM

## 2022-12-09 NOTE — TELEPHONE ENCOUNTER
Tejas JAMESON called stating pt had flexible colonoscopy in 2015 and that it needs to be done every 5 years. States pt wants to go to [facility] on [address] and provided #[phone].

## 2022-12-09 NOTE — TELEPHONE ENCOUNTER
Pt requesting referral for colonoscopy and also psa and lab orders. Pt states he recently had his awv at home.

## 2022-12-09 NOTE — TELEPHONE ENCOUNTER
Carola to pt requesting he tell us name of provider he is requesting to see for colonoscopy as well as find out where his AWV was done. Where is \"home\"?

## 2022-12-15 ENCOUNTER — PATIENT MESSAGE (OUTPATIENT)
Dept: FAMILY MEDICINE CLINIC | Facility: CLINIC | Age: 72
End: 2022-12-15

## 2022-12-15 DIAGNOSIS — Z12.11 SCREENING FOR MALIGNANT NEOPLASM OF COLON: Primary | ICD-10-CM

## 2022-12-15 NOTE — TELEPHONE ENCOUNTER
From: Dorinda Boone  To: Bambi Farooq MD  Sent: 12/15/2022 11:41 AM CST  Subject: Colonoscopy    Dr. Adolph Chandler,   Your first referral has ended his practice Carin Morfin),  Can you recommend another practitioner?

## 2023-01-05 ENCOUNTER — OFFICE VISIT (OUTPATIENT)
Facility: LOCATION | Age: 73
End: 2023-01-05
Payer: MEDICARE

## 2023-01-05 DIAGNOSIS — K63.5 POLYP OF COLON, UNSPECIFIED PART OF COLON, UNSPECIFIED TYPE: Primary | ICD-10-CM

## 2023-01-05 PROCEDURE — 99203 OFFICE O/P NEW LOW 30 MIN: CPT | Performed by: SURGERY

## 2023-01-05 RX ORDER — POLYETHYLENE GLYCOL 3350, SODIUM CHLORIDE, SODIUM BICARBONATE, POTASSIUM CHLORIDE 420; 11.2; 5.72; 1.48 G/4L; G/4L; G/4L; G/4L
POWDER, FOR SOLUTION ORAL
Qty: 1 EACH | Refills: 0 | Status: SHIPPED | OUTPATIENT
Start: 2023-01-05

## 2023-04-06 ENCOUNTER — OFFICE VISIT (OUTPATIENT)
Dept: FAMILY MEDICINE CLINIC | Facility: CLINIC | Age: 73
End: 2023-04-06
Payer: MEDICARE

## 2023-04-06 VITALS
HEIGHT: 70 IN | SYSTOLIC BLOOD PRESSURE: 120 MMHG | DIASTOLIC BLOOD PRESSURE: 80 MMHG | OXYGEN SATURATION: 100 % | BODY MASS INDEX: 24.05 KG/M2 | WEIGHT: 168 LBS | RESPIRATION RATE: 16 BRPM | HEART RATE: 70 BPM

## 2023-04-06 DIAGNOSIS — Z98.890 S/P RIGHT INGUINAL HERNIA REPAIR: ICD-10-CM

## 2023-04-06 DIAGNOSIS — E78.6 LOW HDL (UNDER 40): ICD-10-CM

## 2023-04-06 DIAGNOSIS — J41.0 SMOKERS' COUGH (HCC): ICD-10-CM

## 2023-04-06 DIAGNOSIS — E55.9 VITAMIN D DEFICIENCY: ICD-10-CM

## 2023-04-06 DIAGNOSIS — Z00.00 ENCOUNTER FOR ANNUAL HEALTH EXAMINATION: ICD-10-CM

## 2023-04-06 DIAGNOSIS — C82.93 FOLLICULAR LYMPHOMA OF INTRA-ABDOMINAL LYMPH NODES, UNSPECIFIED GRADE (HCC): ICD-10-CM

## 2023-04-06 DIAGNOSIS — M16.11 PRIMARY OSTEOARTHRITIS OF RIGHT HIP: ICD-10-CM

## 2023-04-06 DIAGNOSIS — Z12.5 SCREENING PSA (PROSTATE SPECIFIC ANTIGEN): ICD-10-CM

## 2023-04-06 DIAGNOSIS — Z96.641 STATUS POST TOTAL REPLACEMENT OF RIGHT HIP: ICD-10-CM

## 2023-04-06 DIAGNOSIS — N40.0 BENIGN NON-NODULAR PROSTATIC HYPERPLASIA WITHOUT LOWER URINARY TRACT SYMPTOMS: ICD-10-CM

## 2023-04-06 DIAGNOSIS — Z00.00 MEDICARE ANNUAL WELLNESS VISIT, SUBSEQUENT: Primary | ICD-10-CM

## 2023-04-06 DIAGNOSIS — Z87.19 S/P RIGHT INGUINAL HERNIA REPAIR: ICD-10-CM

## 2023-04-06 PROBLEM — K40.90 RIGHT INGUINAL HERNIA: Status: RESOLVED | Noted: 2020-07-26 | Resolved: 2023-04-06

## 2023-04-17 ENCOUNTER — LAB ENCOUNTER (OUTPATIENT)
Dept: LAB | Age: 73
End: 2023-04-17
Attending: FAMILY MEDICINE
Payer: MEDICARE

## 2023-04-17 DIAGNOSIS — J41.0 SMOKERS' COUGH (HCC): ICD-10-CM

## 2023-04-17 DIAGNOSIS — C82.93 FOLLICULAR LYMPHOMA OF INTRA-ABDOMINAL LYMPH NODES, UNSPECIFIED GRADE (HCC): ICD-10-CM

## 2023-04-17 DIAGNOSIS — E78.6 LOW HDL (UNDER 40): ICD-10-CM

## 2023-04-17 LAB
ALBUMIN SERPL-MCNC: 3.8 G/DL (ref 3.4–5)
ALBUMIN/GLOB SERPL: 1.1 {RATIO} (ref 1–2)
ALP LIVER SERPL-CCNC: 54 U/L
ALT SERPL-CCNC: 24 U/L
ANION GAP SERPL CALC-SCNC: 3 MMOL/L (ref 0–18)
AST SERPL-CCNC: 16 U/L (ref 15–37)
BASOPHILS # BLD AUTO: 0.04 X10(3) UL (ref 0–0.2)
BASOPHILS NFR BLD AUTO: 0.8 %
BILIRUB SERPL-MCNC: 0.6 MG/DL (ref 0.1–2)
BUN BLD-MCNC: 15 MG/DL (ref 7–18)
CALCIUM BLD-MCNC: 9.3 MG/DL (ref 8.5–10.1)
CHLORIDE SERPL-SCNC: 107 MMOL/L (ref 98–112)
CHOLEST SERPL-MCNC: 147 MG/DL (ref ?–200)
CO2 SERPL-SCNC: 29 MMOL/L (ref 21–32)
CREAT BLD-MCNC: 0.78 MG/DL
EOSINOPHIL # BLD AUTO: 0.1 X10(3) UL (ref 0–0.7)
EOSINOPHIL NFR BLD AUTO: 2.1 %
ERYTHROCYTE [DISTWIDTH] IN BLOOD BY AUTOMATED COUNT: 12.6 %
FASTING PATIENT LIPID ANSWER: YES
FASTING STATUS PATIENT QL REPORTED: YES
GFR SERPLBLD BASED ON 1.73 SQ M-ARVRAT: 94 ML/MIN/1.73M2 (ref 60–?)
GLOBULIN PLAS-MCNC: 3.5 G/DL (ref 2.8–4.4)
GLUCOSE BLD-MCNC: 99 MG/DL (ref 70–99)
HCT VFR BLD AUTO: 40.9 %
HDLC SERPL-MCNC: 45 MG/DL (ref 40–59)
HGB BLD-MCNC: 13.9 G/DL
IMM GRANULOCYTES # BLD AUTO: 0.01 X10(3) UL (ref 0–1)
IMM GRANULOCYTES NFR BLD: 0.2 %
LDLC SERPL CALC-MCNC: 86 MG/DL (ref ?–100)
LYMPHOCYTES # BLD AUTO: 1.72 X10(3) UL (ref 1–4)
LYMPHOCYTES NFR BLD AUTO: 35.5 %
MCH RBC QN AUTO: 30.5 PG (ref 26–34)
MCHC RBC AUTO-ENTMCNC: 34 G/DL (ref 31–37)
MCV RBC AUTO: 89.7 FL
MONOCYTES # BLD AUTO: 0.5 X10(3) UL (ref 0.1–1)
MONOCYTES NFR BLD AUTO: 10.3 %
NEUTROPHILS # BLD AUTO: 2.47 X10 (3) UL (ref 1.5–7.7)
NEUTROPHILS # BLD AUTO: 2.47 X10(3) UL (ref 1.5–7.7)
NEUTROPHILS NFR BLD AUTO: 51.1 %
NONHDLC SERPL-MCNC: 102 MG/DL (ref ?–130)
OSMOLALITY SERPL CALC.SUM OF ELEC: 289 MOSM/KG (ref 275–295)
PLATELET # BLD AUTO: 266 10(3)UL (ref 150–450)
POTASSIUM SERPL-SCNC: 3.9 MMOL/L (ref 3.5–5.1)
PROT SERPL-MCNC: 7.3 G/DL (ref 6.4–8.2)
RBC # BLD AUTO: 4.56 X10(6)UL
SODIUM SERPL-SCNC: 139 MMOL/L (ref 136–145)
T4 FREE SERPL-MCNC: 1.1 NG/DL (ref 0.8–1.7)
TRIGL SERPL-MCNC: 83 MG/DL (ref 30–149)
TSI SER-ACNC: 1.39 MIU/ML (ref 0.36–3.74)
VLDLC SERPL CALC-MCNC: 13 MG/DL (ref 0–30)
WBC # BLD AUTO: 4.8 X10(3) UL (ref 4–11)

## 2023-04-17 PROCEDURE — 84439 ASSAY OF FREE THYROXINE: CPT

## 2023-04-17 PROCEDURE — 36415 COLL VENOUS BLD VENIPUNCTURE: CPT

## 2023-04-17 PROCEDURE — 80061 LIPID PANEL: CPT

## 2023-04-17 PROCEDURE — 85025 COMPLETE CBC W/AUTO DIFF WBC: CPT

## 2023-04-17 PROCEDURE — 80053 COMPREHEN METABOLIC PANEL: CPT

## 2023-04-17 PROCEDURE — 84443 ASSAY THYROID STIM HORMONE: CPT

## 2023-05-01 ENCOUNTER — TELEPHONE (OUTPATIENT)
Facility: LOCATION | Age: 73
End: 2023-05-01

## 2023-05-03 DIAGNOSIS — K63.5 POLYP OF COLON, UNSPECIFIED PART OF COLON, UNSPECIFIED TYPE: Primary | ICD-10-CM

## 2023-05-16 ENCOUNTER — PATIENT OUTREACH (OUTPATIENT)
Facility: LOCATION | Age: 73
End: 2023-05-16

## 2023-08-07 ENCOUNTER — HOSPITAL ENCOUNTER (OUTPATIENT)
Dept: CT IMAGING | Age: 73
Discharge: HOME | End: 2023-08-07
Attending: INTERNAL MEDICINE
Payer: MEDICARE

## 2023-08-07 ENCOUNTER — PATIENT MESSAGE (OUTPATIENT)
Dept: HEMATOLOGY/ONCOLOGY | Age: 73
End: 2023-08-07

## 2023-08-07 DIAGNOSIS — C82.93 FOLLICULAR LYMPHOMA OF INTRA-ABDOMINAL LYMPH NODES, UNSPECIFIED GRADE (HCC): ICD-10-CM

## 2023-08-07 PROCEDURE — 82565 ASSAY OF CREATININE: CPT

## 2023-08-07 PROCEDURE — 74177 CT ABD & PELVIS W/CONTRAST: CPT | Performed by: INTERNAL MEDICINE

## 2023-08-07 NOTE — TELEPHONE ENCOUNTER
FILIBERTOM to schedule an appointment with Dr. Gisela Burgos for MD STEVE in [place], [place] 8/7/23.

## 2023-08-09 LAB
CREAT BLD-MCNC: 0.8 MG/DL
EGFRCR SERPLBLD CKD-EPI 2021: 92 ML/MIN/1.73M2 (ref 60–?)

## 2023-08-14 ENCOUNTER — OFFICE VISIT (OUTPATIENT)
Dept: HEMATOLOGY/ONCOLOGY | Age: 73
End: 2023-08-14
Attending: INTERNAL MEDICINE
Payer: MEDICARE

## 2023-08-14 VITALS
BODY MASS INDEX: 23 KG/M2 | SYSTOLIC BLOOD PRESSURE: 152 MMHG | DIASTOLIC BLOOD PRESSURE: 78 MMHG | WEIGHT: 162.88 LBS | OXYGEN SATURATION: 99 % | TEMPERATURE: 98 F | RESPIRATION RATE: 18 BRPM | HEART RATE: 77 BPM

## 2023-08-14 DIAGNOSIS — C82.93 FOLLICULAR LYMPHOMA OF INTRA-ABDOMINAL LYMPH NODES, UNSPECIFIED GRADE (HCC): Primary | ICD-10-CM

## 2023-08-14 LAB
ALBUMIN SERPL-MCNC: 3.5 G/DL (ref 3.4–5)
ALBUMIN/GLOB SERPL: 1.1 {RATIO} (ref 1–2)
ALP LIVER SERPL-CCNC: 50 U/L
ALT SERPL-CCNC: 21 U/L
ANION GAP SERPL CALC-SCNC: 2 MMOL/L (ref 0–18)
AST SERPL-CCNC: 18 U/L (ref 15–37)
BASOPHILS # BLD AUTO: 0.05 X10(3) UL (ref 0–0.2)
BASOPHILS NFR BLD AUTO: 1 %
BILIRUB SERPL-MCNC: 0.4 MG/DL (ref 0.1–2)
BUN BLD-MCNC: 16 MG/DL (ref 7–18)
CALCIUM BLD-MCNC: 9.4 MG/DL (ref 8.5–10.1)
CHLORIDE SERPL-SCNC: 108 MMOL/L (ref 98–112)
CO2 SERPL-SCNC: 29 MMOL/L (ref 21–32)
CREAT BLD-MCNC: 0.83 MG/DL
EGFRCR SERPLBLD CKD-EPI 2021: 91 ML/MIN/1.73M2 (ref 60–?)
EOSINOPHIL # BLD AUTO: 0.13 X10(3) UL (ref 0–0.7)
EOSINOPHIL NFR BLD AUTO: 2.6 %
ERYTHROCYTE [DISTWIDTH] IN BLOOD BY AUTOMATED COUNT: 12.1 %
FASTING STATUS PATIENT QL REPORTED: NO
GLOBULIN PLAS-MCNC: 3.3 G/DL (ref 2.8–4.4)
GLUCOSE BLD-MCNC: 120 MG/DL (ref 70–99)
HCT VFR BLD AUTO: 38.7 %
HGB BLD-MCNC: 13.5 G/DL
IMM GRANULOCYTES # BLD AUTO: 0.01 X10(3) UL (ref 0–1)
IMM GRANULOCYTES NFR BLD: 0.2 %
LDH SERPL L TO P-CCNC: 159 U/L
LYMPHOCYTES # BLD AUTO: 1.55 X10(3) UL (ref 1–4)
LYMPHOCYTES NFR BLD AUTO: 31.4 %
MCH RBC QN AUTO: 30.8 PG (ref 26–34)
MCHC RBC AUTO-ENTMCNC: 34.9 G/DL (ref 31–37)
MCV RBC AUTO: 88.4 FL
MONOCYTES # BLD AUTO: 0.51 X10(3) UL (ref 0.1–1)
MONOCYTES NFR BLD AUTO: 10.3 %
NEUTROPHILS # BLD AUTO: 2.69 X10 (3) UL (ref 1.5–7.7)
NEUTROPHILS # BLD AUTO: 2.69 X10(3) UL (ref 1.5–7.7)
NEUTROPHILS NFR BLD AUTO: 54.5 %
OSMOLALITY SERPL CALC.SUM OF ELEC: 290 MOSM/KG (ref 275–295)
PLATELET # BLD AUTO: 254 10(3)UL (ref 150–450)
POTASSIUM SERPL-SCNC: 4.3 MMOL/L (ref 3.5–5.1)
PROT SERPL-MCNC: 6.8 G/DL (ref 6.4–8.2)
PSA SERPL-MCNC: 5.78 NG/ML (ref ?–4)
RBC # BLD AUTO: 4.38 X10(6)UL
SODIUM SERPL-SCNC: 139 MMOL/L (ref 136–145)
WBC # BLD AUTO: 4.9 X10(3) UL (ref 4–11)

## 2023-08-14 PROCEDURE — 99214 OFFICE O/P EST MOD 30 MIN: CPT | Performed by: INTERNAL MEDICINE

## 2023-08-14 RX ORDER — MELATONIN
325
COMMUNITY

## 2023-08-14 NOTE — PROGRESS NOTES
Outpatient Oncology Care Plan   Problem list:   fatigue   Problems related to:   self care   Interventions:   provided general teaching   Expected outcomes:   understands plan of care   Progress towards outcome: making progress     Education Record   Learner: Patient   Barriers / Limitations: None   Method: Discussion   Outcome: Shows understanding   Comments:  Patient here for follow-up. Had recent imaging performed. States energy levels are up and down. Has not been taking oral iron.

## 2024-02-23 ENCOUNTER — TELEPHONE (OUTPATIENT)
Dept: FAMILY MEDICINE CLINIC | Facility: CLINIC | Age: 74
End: 2024-02-23

## 2024-02-23 NOTE — TELEPHONE ENCOUNTER
Symptoms started on 2/17/2024.  Tested positive for Covid on 2/18/2024.  Symptoms improving.   Only experiencing congestion and \"occasional\" cough.   Denies fever, SOB, difficulty breathing, chest pain, or fever at this time.   Educated on the patient on quarantine guidelines per CDC. Patient verbalized understanding.  Advised to F/U if symptoms do not continue to improve.   Provided ER precautions.

## 2024-02-23 NOTE — TELEPHONE ENCOUNTER
Pt called stating he tested positive for Covid 02/18/24 with a home test. Today he took another test at home and is still positive. Pt went to [facility] and is still positive.     Pt is requesting a call back with instruction on what to do as he will like to be active this weekend and wants to make sure he is safe to be out.    Pt stated he still has a Cough. Please advice

## 2024-08-05 ENCOUNTER — TELEPHONE (OUTPATIENT)
Dept: HEMATOLOGY/ONCOLOGY | Facility: HOSPITAL | Age: 74
End: 2024-08-05

## 2024-08-05 ENCOUNTER — HOSPITAL ENCOUNTER (OUTPATIENT)
Dept: CT IMAGING | Age: 74
Discharge: HOME | End: 2024-08-05
Attending: INTERNAL MEDICINE
Payer: MEDICARE

## 2024-08-05 DIAGNOSIS — C82.93 FOLLICULAR LYMPHOMA OF INTRA-ABDOMINAL LYMPH NODES, UNSPECIFIED GRADE (HCC): ICD-10-CM

## 2024-08-05 PROCEDURE — 74177 CT ABD & PELVIS W/CONTRAST: CPT | Performed by: INTERNAL MEDICINE

## 2024-08-05 PROCEDURE — 82565 ASSAY OF CREATININE: CPT

## 2024-08-05 PROCEDURE — 71260 CT THORAX DX C+: CPT | Performed by: INTERNAL MEDICINE

## 2024-08-06 LAB
CREAT BLD-MCNC: 0.9 MG/DL
EGFRCR SERPLBLD CKD-EPI 2021: 89 ML/MIN/1.73M2 (ref 60–?)

## 2024-08-16 NOTE — PROGRESS NOTES
[facility] Note    Diagnosis:   1. Stage I Low Grade Follicular Lymphoma  2. FLIPI Score 1     Treatment History: None     Visit Diagnosis:  1. Follicular lymphoma of intra-abdominal lymph nodes, unspecified grade (HCC)        History of Present Illness: 74M with a PMH of arthritis referred by Dr. Fontaine for follicular lymphoma. He is on observation     Hematology/Oncology History:   -He initially met with his PCP, Dr. Hernandez in 12/2018 for groin pain which identified an inguinal hernia. He then met with Dr. Wade to discuss his inguinal hernia and was noted to have mesenteric adenitis. An US revealed enlarged R sided LN along with prominent inguinal LN. Work is below. Labs from 10/16/19 show normal Cr and LFTs. PSA normal at 2.69. TSH normal, WBC 4.3, Hb 12.7 and Plt 220. Normal differential. He denies any B sweats or fevers. He has changed his diet to an all plant based diet and has lost some weight. He is currently at 150 lb but he normally runs 160 lb. He quit smoking in the early 80s. No alcohol. Works as a morgan. He would like to take as much of a holistic approach as possible. Beside post-biopsy pain, no other pain or complaints. No occupational exposure to chemicals.     -1/28/19: CT Pelvis: Mesenteric adenitis: 1.2 x 1.3 cm (largest node)    -9/14/19: CT AP: Multiple mesenteric LN: largest 2.1 x 1.7 cm (grew from previous image)    -10/18/19: Laproscopic robotic assisted mesenteric and RP LN bx.: Mesenteric LN c/w Low grade follicular lymphoma: IHC is positive for CD10, CD20, BCL-2 and BCL-6. Negative for CD5, CyclinD1, LEF1, SOX11. RP LN was benign.     -10/29/19: Bone marrow: sub-optimal core from left illiac bone. Cellular BM with active trilineage hematopoiesis. The core was primarily bone and small area of normocellular marrow. Flow was normal, no monotypic population.     -11/4/19: PET/CT: Enlarged mesenteric LN, largest being 1.4 x 1.2 cm with an SUV of 3.9.     -3/13/20:  CT AP: Several mesenteric nodes have decreased, there was one new LN measuring in 1.5 cm in the mesentery     -3/18/20: He met with Dr. Tay in Radiation Oncology: He was offered ISRT and Observation and opted for observation.    -12/12/20: CT CAP: Few pulmonary nodules about 5 mm. Slight interval decrease in size/number of mesenteric LN (1.5 x 1.3 cm and was previous 1.5 x 1.4 cm).     -5/7/21: CT CAP: Stable pulmonary nodules. Mesenteric LN: 1.8 x 1.7 cm from 1.5 x 1.3    -09/02/21: CT CAP: stable pulmonary nodules at 5 mm. 1.7 x 1.2 R perihilar LN is stable. Mesenteric LN 2.5 x 1.6 (previous 1.8 x 1.7 cm), 1.6 x 1.3 cm (previous 1.2 x 1.1 cm)    -8/11/22: CT AP: Stable mesenteric lymphadenopathy.    -8/7/23: CT AP: 1. Left mesenteric lymph nodes are noted as above.  All but 1 of the lymph nodes appears decreased in size from the prior examination.  Continued follow-up is recommended. 2. Enlarged prostate.  Correlate with PSA.     -08/05/2024: CT CAP: stable bilateral lung nodules. Stable mildly prominent left mesenteric LN. Additional left mesenteric LAD had decreased.     Interval history  -CT better  -No fevers, night sweats or weight loss. Appetite is good. No abdominal pain.     Review of Systems: 12 Point ROS was completed and pertinent positives are in the HPI    Current Outpatient Medications on File Prior to Visit   Medication Sig Dispense Refill    ferrous sulfate 325 (65 FE) MG Oral Tab EC Take 1 tablet (325 mg total) by mouth daily with breakfast.      Ascorbic Acid (VITAMIN C) 100 MG Oral Tab Take 1 tablet (100 mg total) by mouth daily. (Patient not taking: Reported on 8/14/2023)      omega-3 fatty acids 1000 MG Oral Cap Take 1,000 mg by mouth daily. (Patient not taking: Reported on 8/14/2023)      Vitamin B-12 100 MCG Oral Tab Take 0.5 tablets (50 mcg total) by mouth daily. (Patient not taking: Reported on 8/14/2023)       Current Facility-Administered Medications on File Prior to Visit   Medication  Dose Route Frequency Provider Last Rate Last Admin    [COMPLETED] iopamidol 76% (ISOVUE-370) injection for power injector  100 mL Intravenous ONCE PRN Tracie Gilbert MD   100 mL at 24 0815     Past Medical History:    Arthritis    Cancer (HCC)    Low Grade Follicular Lymphoma    Hearing impairment    Knik bilateral hearing aids    Hernia, inguinal    Problems with swallowing    chokes easily-needs to drink/eat slowly    Sciatica of right side    Vertigo    Visual impairment    glasses     Past Surgical History:   Procedure Laterality Date    Appendectomy      Appendectomy      Colonoscopy      Colonoscopy  2014    Hip replacement surgery Right 2017    Other surgical history  10/18/2019    Laparoscopic robotic-assisted mesenteric and retroperitoneal lymph node biopsies    Tonsillectomy  grammar elvin. years    childhood    Total hip replacement Right      Social History     Socioeconomic History    Marital status: Single    Number of children: 0   Tobacco Use    Smoking status: Former     Current packs/day: 0.00     Average packs/day: 1 pack/day for 10.0 years (10.0 ttl pk-yrs)     Types: Cigarettes     Start date: 3/3/1973     Quit date: 3/3/1983     Years since quittin.4    Smokeless tobacco: Never   Vaping Use    Vaping status: Never Used   Substance and Sexual Activity    Alcohol use: Not Currently     Comment: A glass of wine or beer on rare occasions    Drug use: No      Family History   Problem Relation Age of Onset    Cancer Father         prostate    Heart Disorder Father     Cancer Maternal Aunt     Cancer Sister         Uterine       Physical Exam  Height: --  Weight: 75.2 kg (165 lb 12.8 oz) (1448)  BSA (Calculated - sq m): --  Pulse: 68 ( 144)  BP: 160/78 (1448)  Temp: 97.6 °F (36.4 °C) (1448)  Do Not Use - Resp Rate: --  SpO2: 98 % (1448)     General: NAD, AOX3  HEENT: clear op, mmm, no jvd, no scleral icterus  LN: no supraclavicular, infraclavicular, axillary  LAD  CV: RRR S1S2 no murmurs  Extremities: No edema   Lungs: CTAB, no increased work of breathing  Abd: soft nt nd +BS no hepatosplenomegaly  Neuro: CN: II-XII grossly intact    Results:  Lab Results   Component Value Date    WBC 4.9 08/14/2023    HGB 13.5 08/14/2023    HCT 38.7 (L) 08/14/2023    MCV 88.4 08/14/2023    .0 08/14/2023     Lab Results   Component Value Date     08/14/2023    K 4.3 08/14/2023    CO2 29.0 08/14/2023     08/14/2023    BUN 16 08/14/2023    ALB 3.5 08/14/2023       Radiology: I personally reviewed the imaging    Pathology: reviewed   10/18/19  Final Diagnosis:   A- Mesenteric lymph node:  -Fibroadipose tissue.  -No lymphoid tissue present.     B, D- Mesenteric lymph node:  -Follicular lymphoma, low-grade.     C- Retroperitoneal lymph node:  -Benign lymph node.  -No evidence of malignancy.       Assessment and Plan:  Stage I Low Grade Follicular Lymphoma: Dx 10/2019  - FLIPI Score 1: due to age  - PET/CT as above with only mesenteric LAD.    - Bone marrow biopsy was sub-optimal, but appears to be negative. No BM activity on PET  - Discussed that he has stage I disease. Given location of disease, observation could be considered. However, Stage I/II FL is typically managed with ISRT alone. He met with radiation oncology and opted for observation.   - Discussed indications to treat: B symptoms. Cytopenias, Bulky disease (3 or more sites + > 3 cm, > 7 cm, leukemic phase)  -CBC/CMP/LDH today  -Now that he is 5 years out, we can consider observation and consider imaging based of sx    Normocytic Anemia: Repeat CBC normal.  [facility]     R Inguinal Hernia:  S/p repair in 04/2021    Enlarged Prostate: PSA ordered     Risk: High-follicular lymphoma     RTC in 12 months     [facility]

## 2024-08-19 ENCOUNTER — OFFICE VISIT (OUTPATIENT)
Dept: HEMATOLOGY/ONCOLOGY | Age: 74
End: 2024-08-19
Attending: INTERNAL MEDICINE
Payer: MEDICARE

## 2024-08-19 VITALS
BODY MASS INDEX: 24 KG/M2 | RESPIRATION RATE: 18 BRPM | WEIGHT: 165.81 LBS | DIASTOLIC BLOOD PRESSURE: 78 MMHG | OXYGEN SATURATION: 98 % | HEART RATE: 68 BPM | SYSTOLIC BLOOD PRESSURE: 160 MMHG | TEMPERATURE: 98 F

## 2024-08-19 DIAGNOSIS — C82.93 FOLLICULAR LYMPHOMA OF INTRA-ABDOMINAL LYMPH NODES, UNSPECIFIED GRADE (HCC): Primary | ICD-10-CM

## 2024-08-19 LAB
ALBUMIN SERPL-MCNC: 3.7 G/DL (ref 3.4–5)
ALBUMIN/GLOB SERPL: 1.2 {RATIO} (ref 1–2)
ALP LIVER SERPL-CCNC: 56 U/L
ALT SERPL-CCNC: 24 U/L
ANION GAP SERPL CALC-SCNC: 5 MMOL/L (ref 0–18)
AST SERPL-CCNC: 16 U/L (ref 15–37)
BASOPHILS # BLD AUTO: 0.04 X10(3) UL (ref 0–0.2)
BASOPHILS NFR BLD AUTO: 0.7 %
BILIRUB SERPL-MCNC: 0.5 MG/DL (ref 0.1–2)
BUN BLD-MCNC: 22 MG/DL (ref 9–23)
CALCIUM BLD-MCNC: 9.5 MG/DL (ref 8.5–10.1)
CHLORIDE SERPL-SCNC: 107 MMOL/L (ref 98–112)
CO2 SERPL-SCNC: 27 MMOL/L (ref 21–32)
CREAT BLD-MCNC: 0.86 MG/DL
EGFRCR SERPLBLD CKD-EPI 2021: 90 ML/MIN/1.73M2 (ref 60–?)
EOSINOPHIL # BLD AUTO: 0.09 X10(3) UL (ref 0–0.7)
EOSINOPHIL NFR BLD AUTO: 1.7 %
ERYTHROCYTE [DISTWIDTH] IN BLOOD BY AUTOMATED COUNT: 12.5 %
FASTING STATUS PATIENT QL REPORTED: NO
GLOBULIN PLAS-MCNC: 3 G/DL (ref 2.8–4.4)
GLUCOSE BLD-MCNC: 99 MG/DL (ref 70–99)
HCT VFR BLD AUTO: 37.1 %
HGB BLD-MCNC: 12.9 G/DL
IMM GRANULOCYTES # BLD AUTO: 0.02 X10(3) UL (ref 0–1)
IMM GRANULOCYTES NFR BLD: 0.4 %
LYMPHOCYTES # BLD AUTO: 1.97 X10(3) UL (ref 1–4)
LYMPHOCYTES NFR BLD AUTO: 36.1 %
MCH RBC QN AUTO: 30.9 PG (ref 26–34)
MCHC RBC AUTO-ENTMCNC: 34.8 G/DL (ref 31–37)
MCV RBC AUTO: 88.8 FL
MONOCYTES # BLD AUTO: 0.55 X10(3) UL (ref 0.1–1)
MONOCYTES NFR BLD AUTO: 10.1 %
NEUTROPHILS # BLD AUTO: 2.78 X10 (3) UL (ref 1.5–7.7)
NEUTROPHILS # BLD AUTO: 2.78 X10(3) UL (ref 1.5–7.7)
NEUTROPHILS NFR BLD AUTO: 51 %
OSMOLALITY SERPL CALC.SUM OF ELEC: 291 MOSM/KG (ref 275–295)
PLATELET # BLD AUTO: 267 10(3)UL (ref 150–450)
POTASSIUM SERPL-SCNC: 3.8 MMOL/L (ref 3.5–5.1)
PROT SERPL-MCNC: 6.7 G/DL (ref 6.4–8.2)
RBC # BLD AUTO: 4.18 X10(6)UL
SODIUM SERPL-SCNC: 139 MMOL/L (ref 136–145)
WBC # BLD AUTO: 5.5 X10(3) UL (ref 4–11)

## 2024-08-19 PROCEDURE — 99214 OFFICE O/P EST MOD 30 MIN: CPT | Performed by: INTERNAL MEDICINE

## 2024-08-19 NOTE — PATIENT INSTRUCTIONS
Please watch out for: fevers, night sweats, unintentional weight loss, new unexplained pain, worsening fatigue or changes in appetite.

## 2024-08-19 NOTE — PROGRESS NOTES
Patient here for follow-up. Had CT 8/5/24. States he feels well. Denies any updates or changes since last visit.

## 2024-11-15 ENCOUNTER — OFFICE VISIT (OUTPATIENT)
Dept: FAMILY MEDICINE CLINIC | Facility: CLINIC | Age: 74
End: 2024-11-15
Payer: MEDICARE

## 2024-11-15 VITALS
RESPIRATION RATE: 18 BRPM | TEMPERATURE: 100 F | DIASTOLIC BLOOD PRESSURE: 70 MMHG | SYSTOLIC BLOOD PRESSURE: 142 MMHG | BODY MASS INDEX: 24 KG/M2 | HEART RATE: 78 BPM | WEIGHT: 164 LBS | OXYGEN SATURATION: 98 %

## 2024-11-15 DIAGNOSIS — S16.1XXA CERVICAL STRAIN, ACUTE, INITIAL ENCOUNTER: Primary | ICD-10-CM

## 2024-11-15 PROCEDURE — 99213 OFFICE O/P EST LOW 20 MIN: CPT | Performed by: NURSE PRACTITIONER

## 2024-11-15 PROCEDURE — 1160F RVW MEDS BY RX/DR IN RCRD: CPT | Performed by: NURSE PRACTITIONER

## 2024-11-15 PROCEDURE — 1159F MED LIST DOCD IN RCRD: CPT | Performed by: NURSE PRACTITIONER

## 2024-11-15 RX ORDER — NAPROXEN SODIUM 550 MG/1
550 TABLET ORAL 2 TIMES DAILY WITH MEALS
Qty: 28 TABLET | Refills: 0 | Status: SHIPPED | OUTPATIENT
Start: 2024-11-15 | End: 2024-11-29

## 2024-11-15 RX ORDER — CYCLOBENZAPRINE HCL 5 MG
5 TABLET ORAL 3 TIMES DAILY PRN
Qty: 15 TABLET | Refills: 0 | Status: SHIPPED | OUTPATIENT
Start: 2024-11-15 | End: 2024-11-20

## 2025-06-20 ENCOUNTER — OFFICE VISIT (OUTPATIENT)
Dept: FAMILY MEDICINE CLINIC | Facility: CLINIC | Age: 75
End: 2025-06-20
Payer: MEDICARE

## 2025-06-20 ENCOUNTER — PATIENT MESSAGE (OUTPATIENT)
Dept: FAMILY MEDICINE CLINIC | Facility: CLINIC | Age: 75
End: 2025-06-20

## 2025-06-20 VITALS
HEART RATE: 76 BPM | BODY MASS INDEX: 23.74 KG/M2 | DIASTOLIC BLOOD PRESSURE: 80 MMHG | WEIGHT: 164 LBS | OXYGEN SATURATION: 97 % | RESPIRATION RATE: 19 BRPM | SYSTOLIC BLOOD PRESSURE: 138 MMHG | HEIGHT: 69.5 IN

## 2025-06-20 DIAGNOSIS — Z12.5 SCREENING FOR PROSTATE CANCER: ICD-10-CM

## 2025-06-20 DIAGNOSIS — Z96.641 STATUS POST TOTAL REPLACEMENT OF RIGHT HIP: ICD-10-CM

## 2025-06-20 DIAGNOSIS — E78.6 LOW HDL (UNDER 40): ICD-10-CM

## 2025-06-20 DIAGNOSIS — E55.9 VITAMIN D DEFICIENCY: ICD-10-CM

## 2025-06-20 DIAGNOSIS — Z77.098 EXPOSURE TO AGENT ORANGE: ICD-10-CM

## 2025-06-20 DIAGNOSIS — Z00.00 MEDICARE ANNUAL WELLNESS VISIT, SUBSEQUENT: Primary | ICD-10-CM

## 2025-06-20 DIAGNOSIS — Z98.890 S/P RIGHT INGUINAL HERNIA REPAIR: ICD-10-CM

## 2025-06-20 DIAGNOSIS — C82.93 FOLLICULAR LYMPHOMA OF INTRA-ABDOMINAL LYMPH NODES, UNSPECIFIED GRADE (HCC): ICD-10-CM

## 2025-06-20 DIAGNOSIS — M16.11 PRIMARY OSTEOARTHRITIS OF RIGHT HIP: ICD-10-CM

## 2025-06-20 DIAGNOSIS — Z87.19 S/P RIGHT INGUINAL HERNIA REPAIR: ICD-10-CM

## 2025-06-20 DIAGNOSIS — N40.0 BENIGN NON-NODULAR PROSTATIC HYPERPLASIA WITHOUT LOWER URINARY TRACT SYMPTOMS: ICD-10-CM

## 2025-06-20 PROBLEM — J41.0 SMOKERS' COUGH (HCC): Chronic | Status: RESOLVED | Noted: 2022-04-04 | Resolved: 2025-06-20

## 2025-06-20 RX ORDER — ACETAMINOPHEN 160 MG
2000 TABLET,DISINTEGRATING ORAL DAILY
COMMUNITY

## 2025-06-20 RX ORDER — MULTIVIT WITH MINERALS/LUTEIN
1000 TABLET ORAL DAILY
COMMUNITY

## 2025-06-20 NOTE — PROGRESS NOTES
The following individual(s) verbally consented to be recorded using ambient AI listening technology and understand that they can each withdraw their consent to this listening technology at any point by asking the clinician to turn off or pause the recording:    Patient name: Neymar Hamilton

## 2025-06-21 ENCOUNTER — LAB ENCOUNTER (OUTPATIENT)
Dept: LAB | Age: 75
End: 2025-06-21
Attending: FAMILY MEDICINE
Payer: MEDICARE

## 2025-06-21 DIAGNOSIS — Z00.00 MEDICARE ANNUAL WELLNESS VISIT, SUBSEQUENT: ICD-10-CM

## 2025-06-21 DIAGNOSIS — Z12.5 SCREENING FOR PROSTATE CANCER: ICD-10-CM

## 2025-06-21 DIAGNOSIS — N40.0 BENIGN NON-NODULAR PROSTATIC HYPERPLASIA WITHOUT LOWER URINARY TRACT SYMPTOMS: ICD-10-CM

## 2025-06-21 LAB
ALBUMIN SERPL-MCNC: 4.5 G/DL (ref 3.2–4.8)
ALBUMIN/GLOB SERPL: 1.9 {RATIO} (ref 1–2)
ALP LIVER SERPL-CCNC: 52 U/L (ref 45–117)
ALT SERPL-CCNC: 15 U/L (ref 10–49)
ANION GAP SERPL CALC-SCNC: 8 MMOL/L (ref 0–18)
AST SERPL-CCNC: 22 U/L (ref ?–34)
BASOPHILS # BLD AUTO: 0.04 X10(3) UL (ref 0–0.2)
BASOPHILS NFR BLD AUTO: 0.8 %
BILIRUB SERPL-MCNC: 0.6 MG/DL (ref 0.2–1.1)
BUN BLD-MCNC: 13 MG/DL (ref 9–23)
CALCIUM BLD-MCNC: 9.8 MG/DL (ref 8.7–10.6)
CHLORIDE SERPL-SCNC: 105 MMOL/L (ref 98–112)
CHOLEST SERPL-MCNC: 155 MG/DL (ref ?–200)
CO2 SERPL-SCNC: 27 MMOL/L (ref 21–32)
COMPLEXED PSA SERPL-MCNC: 2.77 NG/ML (ref ?–4)
CREAT BLD-MCNC: 0.99 MG/DL (ref 0.7–1.3)
EGFRCR SERPLBLD CKD-EPI 2021: 78 ML/MIN/1.73M2 (ref 60–?)
EOSINOPHIL # BLD AUTO: 0.16 X10(3) UL (ref 0–0.7)
EOSINOPHIL NFR BLD AUTO: 3.3 %
ERYTHROCYTE [DISTWIDTH] IN BLOOD BY AUTOMATED COUNT: 12.8 %
FASTING PATIENT LIPID ANSWER: YES
FASTING STATUS PATIENT QL REPORTED: YES
GLOBULIN PLAS-MCNC: 2.4 G/DL (ref 2–3.5)
GLUCOSE BLD-MCNC: 106 MG/DL (ref 70–99)
HCT VFR BLD AUTO: 38.5 % (ref 39–53)
HDLC SERPL-MCNC: 43 MG/DL (ref 40–59)
HGB BLD-MCNC: 13.2 G/DL (ref 13–17.5)
IMM GRANULOCYTES # BLD AUTO: 0.01 X10(3) UL (ref 0–1)
IMM GRANULOCYTES NFR BLD: 0.2 %
LDLC SERPL CALC-MCNC: 95 MG/DL (ref ?–100)
LYMPHOCYTES # BLD AUTO: 1.62 X10(3) UL (ref 1–4)
LYMPHOCYTES NFR BLD AUTO: 33.1 %
MCH RBC QN AUTO: 31.1 PG (ref 26–34)
MCHC RBC AUTO-ENTMCNC: 34.3 G/DL (ref 31–37)
MCV RBC AUTO: 90.6 FL (ref 80–100)
MONOCYTES # BLD AUTO: 0.59 X10(3) UL (ref 0.1–1)
MONOCYTES NFR BLD AUTO: 12 %
NEUTROPHILS # BLD AUTO: 2.48 X10 (3) UL (ref 1.5–7.7)
NEUTROPHILS # BLD AUTO: 2.48 X10(3) UL (ref 1.5–7.7)
NEUTROPHILS NFR BLD AUTO: 50.6 %
NONHDLC SERPL-MCNC: 112 MG/DL (ref ?–130)
OSMOLALITY SERPL CALC.SUM OF ELEC: 291 MOSM/KG (ref 275–295)
PLATELET # BLD AUTO: 293 10(3)UL (ref 150–450)
POTASSIUM SERPL-SCNC: 4.4 MMOL/L (ref 3.5–5.1)
PROT SERPL-MCNC: 6.9 G/DL (ref 5.7–8.2)
RBC # BLD AUTO: 4.25 X10(6)UL (ref 3.8–5.8)
SODIUM SERPL-SCNC: 140 MMOL/L (ref 136–145)
TRIGL SERPL-MCNC: 92 MG/DL (ref 30–149)
TSI SER-ACNC: 1.51 UIU/ML (ref 0.55–4.78)
VLDLC SERPL CALC-MCNC: 15 MG/DL (ref 0–30)
WBC # BLD AUTO: 4.9 X10(3) UL (ref 4–11)

## 2025-06-21 PROCEDURE — 84443 ASSAY THYROID STIM HORMONE: CPT

## 2025-06-21 PROCEDURE — 85025 COMPLETE CBC W/AUTO DIFF WBC: CPT

## 2025-06-21 PROCEDURE — 36415 COLL VENOUS BLD VENIPUNCTURE: CPT

## 2025-06-21 PROCEDURE — 80053 COMPREHEN METABOLIC PANEL: CPT

## 2025-06-21 PROCEDURE — 80061 LIPID PANEL: CPT

## 2025-06-23 NOTE — PROGRESS NOTES
Subjective:   Neymar Hamilton is a 75 year old male who presents for a MA AHA (Medicare Advantage Annual Health Assessment) and scheduled follow up of multiple significant but stable problems.   History of Present Illness  Neymar Hamilton is a 75 year old male who presents for documentation support for VA disability claims related to Agent Orange exposure.    He has stage one low grade follicular lymphoma and is seeking documentation to support his VA disability claims. He served in [employer] from 1968 to 1969 and was exposed to Agent Orange during his service in the [employer], where [place] and used ocean water for drinking, which he later learned could have been contaminated.    He has pulmonary nodules, with four or five dark spots noted on his lungs during radiation imaging. The [facility] monitored these nodules for a while but has since stopped. He wonders if these could be related to asbestos exposure, as his ship, the [employer], is listed as having asbestos risks. He has not seen a pulmonologist and is unsure if further investigation is needed.    His last PSA test was near five. He has not been diagnosed with prostate cancer but is concerned about the possibility due to his enlarged prostate.    He experiences some trembling and wonders if it could be related to Parkinson's disease, although he has not been diagnosed with it. He also mentions swelling in his ankles, which he attributes to physical activity, but notes it has been present for some time.    He has a history of smoking but quit in 1983. He does not currently take any blood pressure medication. 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    History/Other:   Fall Risk Assessment:   He has been screened for Falls and is High Risk. Fall Prevention information provided to patient in After Visit Summary.    Do you feel unsteady when  standing or walking?: No  Do you worry about falling?: Yes  Have you fallen in the past year?: No     Cognitive Assessment:   He had a completely normal cognitive assessment - see flowsheet entries     Functional Ability/Status:   Neymar Hamilton has some abnormal functions as listed below:  He has Hearing problems based on screening of functional status.He has Vision problems based on screening of functional status. He has problems with Memory based on screening of functional status.       Depression Screening (PHQ):  PHQ-2 SCORE: 1  , done 6/20/2025   Little interest or pleasure in doing things: 1              Advanced Directives:   He does have a Living Will but we do NOT have it on file in Epic.    He does have a POA but we do NOT have it on file in Epic.    Discussed Advance Care Planning with patient (and family/surrogate if present). Standard forms made available to patient in After Visit Summary.      Problem List[1]  Allergies:  He has no known allergies.    Current Medications:  Active Meds, Sig Only[2]    Medical History:  He  has a past medical history of Arthritis, Cancer (HCC), Hearing impairment, Hernia, inguinal, Problems with swallowing, Sciatica of right side, Vertigo, and Visual impairment.  Surgical History:  He  has a past surgical history that includes colonoscopy; other surgical history (10/18/2019); appendectomy; total hip replacement (Right); colonoscopy (2014); appendectomy; hip replacement surgery (Right, 2017); and tonsillectomy (grammar elvin. years).   Family History:  His family history includes Cancer in his father, maternal aunt, and sister; Heart Disorder in his father.  Social History:  He  reports that he quit smoking about 42 years ago. His smoking use included cigarettes. He started smoking about 52 years ago. He has a 10 pack-year smoking history. He has never used smokeless tobacco. He reports that he does not currently use alcohol. He reports that he does not use  drugs.    Tobacco:  He smoked tobacco in the past but quit greater than 12 months ago.  Tobacco Use[3]     CAGE Alcohol Screen:   CAGE screening score of 0 on 6/20/2025, showing low risk of alcohol abuse.      Patient Care Team:  Len Hernandez MD as PCP - General (Family Medicine)  Macario Miller PT as Physical Therapist  Momo Wade MD (SURGERY, GENERAL)  Bladimir Olivas MD (SURGERY, ORTHOPEDIC)  Stephan Tay MD (Radiation Oncology)  Stefany Burroughs RN as Registered Nurse (Registered Nurse)    Review of Systems  GENERAL: feels well otherwise  SKIN: denies any unusual skin lesions  EYES: denies blurred vision or double vision  HEENT: denies nasal congestion, sinus pain or ST  LUNGS: denies shortness of breath with exertion  CARDIOVASCULAR: denies chest pain on exertion  GI: denies abdominal pain, denies heartburn  : 1 per night nocturia, no complaint of urinary incontinence  MUSCULOSKELETAL: denies back pain  NEURO: denies headaches  PSYCHE: denies depression or anxiety  HEMATOLOGIC: denies hx of anemia  ENDOCRINE: denies thyroid history  ALL/ASTHMA: denies hx of allergy or asthma    Objective:   Physical Exam  General Appearance:  Alert, cooperative, no distress, appears stated age   Head:  Normocephalic, without obvious abnormality, atraumatic   Eyes:  PERRL, conjunctiva/corneas clear, EOM's intact, both eyes   Ears:  Normal TM's and external ear canals, both ears   Nose: Nares normal, septum midline, mucosa normal, no drainage or sinus tenderness   Throat: Lips, mucosa, and tongue normal; teeth and gums normal   Neck: Supple, symmetrical, trachea midline, no adenopathy, thyroid: not enlarged, symmetric, no tenderness/mass/nodules, no carotid bruit or JVD   Back:   Symmetric, no curvature, ROM normal, no CVA tenderness   Lungs:   Clear to auscultation bilaterally, respirations unlabored   Chest Wall:  No tenderness or deformity   Heart:  Regular rate and rhythm, S1, S2 normal, no murmur, rub or  gallop   Abdomen:   Soft, non-tender, bowel sounds active all four quadrants,  no masses, no organomegaly           Extremities: Extremities normal, atraumatic, no cyanosis or edema   Pulses: 2+ and symmetric   Skin: Skin color, texture, turgor normal, no rashes or lesions   Lymph nodes: Cervical, supraclavicular, and axillary nodes normal   Neurologic: Normal     /80   Pulse 76   Resp 19   Ht 5' 9.5\" (1.765 m)   Wt 164 lb (74.4 kg)   SpO2 97%   BMI 23.87 kg/m²  Estimated body mass index is 23.87 kg/m² as calculated from the following:    Height as of this encounter: 5' 9.5\" (1.765 m).    Weight as of this encounter: 164 lb (74.4 kg).    Medicare Hearing Assessment:   Hearing Screening    Time taken: 6/20/2025  2:01 PM  Entry User: Jaya Spain MA  Screening Method: Whisper Test  Whisper Test Result: Fail         Assessment & Plan:   Neymar Hamilton is a 75 year old male who presents for a Medicare Assessment.     1. Medicare annual wellness visit, subsequent  -supervisit was done  - CBC With Differential With Platelet; Future  - Comp Metabolic Panel (14); Future  - Lipid Panel; Future  - TSH W Reflex To Free T4; Future    2. Screening for prostate cancer  -will check labs, history of BPH  - PSA Total, Screen; Future    3. Follicular lymphoma of intra-abdominal lymph nodes, unspecified grade (HCC)  -seeing Oncology, in remission, observation for now, seeing Rad/Onco-observation for now.     4. Benign non-nodular prostatic hyperplasia without lower urinary tract symptoms  -will check labs  - PSA Total, Screen; Future    5. Low HDL (under 40)  -stable, CPM    6. Primary osteoarthritis of right hip          Global exp 6-20-17 / RIGHT THR / AHK / EB   -stable, CPM    7. S/P right inguinal hernia repair  -stable, CPM    8. Status post total replacement of right hip  -stable, CPM    9. Vitamin D deficiency  -stable, CPM    10. Exposure to Agent Orange  -high risk for developing Follicular Lymphoma was  discussed.   Assessment & Plan  Stage 1 low-grade follicular lymphoma  He is under the care of Dr. Malin for stage 1 low-grade follicular lymphoma and seeks documentation for a VA disability claim related to Agent Orange exposure, which is recognized by the [facility] for lymphoma-related benefits. He is concerned about the reduced frequency of CT scans due to insurance limitations.  - Provide documentation supporting the diagnosis for VA disability claim.  - Discuss with Dr. Malin the possibility of scheduling a CT scan to monitor lymphoma progression.    Pulmonary nodules  Pulmonary nodules identified on an August 2024 CT scan are well-managed with no signs of asbestos-related disease such as mesothelioma. He does not currently see a pulmonologist. Documentation of potential asbestos exposure during  service will be noted in his medical record.  - Consider referral to pulmonologist if symptoms change or further evaluation is needed.  - Document potential asbestos exposure in medical record.    Peripheral edema  He reports ankle swelling, likely due to fluid retention from physical activity. A diuretic may be considered if swelling persists. Lifestyle modifications, including a low sodium diet and elevating feet, were discussed as alternatives to medication.  - Schedule follow-up appointment in three months to reassess peripheral edema.  - Consider starting diuretic if swelling persists.  - Advise on low sodium diet and elevating feet to manage fluid retention.    Prostate health monitoring  He has an enlarged prostate with a previous PSA level near 5, without a prostate cancer diagnosis. Monitoring PSA levels is planned to assess for changes indicating prostate cancer.  - Order PSA test to monitor prostate health.    General Health Maintenance  He quit smoking in 1983. Blood pressure is 138/80, within acceptable limits. Lifestyle modifications to manage fluid retention and borderline blood pressure were  discussed.  - Monitor blood pressure and consider medication if it becomes elevated.    Goals of Care  He seeks VA disability benefits related to lymphoma and potential asbestos exposure, and is considering PTSD related to  service. These concerns will be documented to support his claims.  - Document history of Agent Orange exposure and potential asbestos exposure in medical record.  - Include potential PTSD related to  service in medical record.    Follow-up  Advised to follow up in three months to reassess peripheral edema and discuss VA disability claim developments.  - Schedule follow-up appointment in three months.  The patient indicates understanding of these issues and agrees to the plan.  Continue with current treatment plan.  Reinforced healthy diet, lifestyle, and exercise.    Len Hernandez MD, 6/23/2025     Supplementary Documentation:   General Health:  In the past six months, have you lost more than 10 pounds without trying?: 2 - No  Has your appetite been poor?: No  Type of Diet: Other  How does the patient maintain a good energy level?: Other  How would you describe your daily physical activity?: None  How would you describe your current health state?: Fair  How do you maintain positive mental well-being?: Social Interaction, Visiting Family, Puzzles, Games, Visiting Friends  On a scale of 0 to 10, with 0 being no pain and 10 being severe pain, what is your pain level?: 1 - (Mild)  In the past six months, have you experienced urine leakage?: 1-Yes  At any time do you feel concerned for the safety/well-being of yourself and/or your children, in your home or elsewhere?: Yes  Have you had any immunizations at another office such as Influenza, Hepatitis B, Tetanus, or Pneumococcal?: No    Health Maintenance   Topic Date Due    COVID-19 Vaccine (3 - 2024-25 season) 09/01/2024    Annual Well Visit  01/01/2025    Influenza Vaccine  Completed    Annual Depression Screening  Completed    Fall  Risk Screening (Annual)  Completed    Pneumococcal Vaccine: 50+ Years  Completed    Zoster Vaccines  Completed    Meningococcal B Vaccine  Aged Out    Colorectal Cancer Screening  Discontinued            [1]   Patient Active Problem List  Diagnosis    Benign non-nodular prostatic hyperplasia without lower urinary tract symptoms    Primary osteoarthritis of right hip          Global exp 17 / RIGHT THR / AHK / EB     Status post total replacement of right hip    Low HDL (under 40)    Follicular lymphoma (HCC)    Vitamin D deficiency    S/P right inguinal hernia repair   [2]   Outpatient Medications Marked as Taking for the 25 encounter (Office Visit) with Len Hernandez MD   Medication Sig    vitamin E 1000 UNITS Oral Cap Take 1 capsule (1,000 Units total) by mouth daily.    cholecalciferol 50 MCG (2000 UT) Oral Cap Take 1 capsule (2,000 Units total) by mouth daily.    Multiple Vitamin (MULTIVITAMIN ADULT OR) Take by mouth.    ferrous sulfate 325 (65 FE) MG Oral Tab EC Take 1 tablet (325 mg total) by mouth daily with breakfast.    Ascorbic Acid (VITAMIN C) 100 MG Oral Tab Take 1 tablet (100 mg total) by mouth daily.    omega-3 fatty acids 1000 MG Oral Cap Take 1,000 mg by mouth daily.    Vitamin B-12 100 MCG Oral Tab Take 0.5 tablets (50 mcg total) by mouth daily.   [3]   Social History  Tobacco Use   Smoking Status Former    Current packs/day: 0.00    Average packs/day: 1 pack/day for 10.0 years (10.0 ttl pk-yrs)    Types: Cigarettes    Start date: 3/3/1973    Quit date: 3/3/1983    Years since quittin.3   Smokeless Tobacco Never

## 2025-08-14 ENCOUNTER — HOSPITAL ENCOUNTER (OUTPATIENT)
Dept: CT IMAGING | Age: 75
Discharge: HOME | End: 2025-08-14
Attending: INTERNAL MEDICINE

## 2025-08-14 DIAGNOSIS — C82.90 FOLLICULAR LYMPHOMA, UNSPECIFIED FOLLICULAR LYMPHOMA TYPE, UNSPECIFIED BODY REGION (HCC): ICD-10-CM

## 2025-08-14 PROCEDURE — 74177 CT ABD & PELVIS W/CONTRAST: CPT | Performed by: INTERNAL MEDICINE

## 2025-08-14 PROCEDURE — 71260 CT THORAX DX C+: CPT | Performed by: INTERNAL MEDICINE

## (undated) DEVICE — DERMABOND LIQUID ADHESIVE

## (undated) DEVICE — CANNULA SEAL

## (undated) DEVICE — TIBURON DRAPE TOWELS: Brand: CONVERTORS

## (undated) DEVICE — SOL  .9 1000ML BAG

## (undated) DEVICE — STERILE POLYISOPRENE POWDER-FREE SURGICAL GLOVES: Brand: PROTEXIS

## (undated) DEVICE — HOOD, PEEL-AWAY: Brand: FLYTE

## (undated) DEVICE — COLUMN DRAPE

## (undated) DEVICE — Device: Brand: STABLECUT®

## (undated) DEVICE — DRAPE,TAPE STRIPS,STERILE: Brand: MEDLINE

## (undated) DEVICE — WRAP COOLING HIP W/ICE PILLOW

## (undated) DEVICE — GLOVE SURG NEOLON SZ 7

## (undated) DEVICE — BIPOLAR SEALER 23-112-1 AQM 6.0: Brand: AQUAMANTYS™

## (undated) DEVICE — PAD SACRAL SPAN AID

## (undated) DEVICE — ROBOTIC GENERAL: Brand: MEDLINE INDUSTRIES, INC.

## (undated) DEVICE — TROCAR: Brand: KII SHIELDED BLADED ACCESS SYSTEM

## (undated) DEVICE — GOWN SURG AERO CHROME XXL

## (undated) DEVICE — UNDYED BRAIDED (POLYGLACTIN 910), SYNTHETIC ABSORBABLE SUTURE: Brand: COATED VICRYL

## (undated) DEVICE — GAUZE SPONGES,USP TYPE VII GAUZE, 12 PLY: Brand: CURITY

## (undated) DEVICE — TIP COVER ACCESSORY

## (undated) DEVICE — TROCAR: Brand: KII FIOS FIRST ENTRY

## (undated) DEVICE — ARM 4 PROXIMAL DRAPE

## (undated) DEVICE — SUTURE MONOCRYL 4-0 PS-2

## (undated) DEVICE — DRESSING AQUACEL AG 3.5 X 10

## (undated) DEVICE — MEGA SUTURECUT ND: Brand: ENDOWRIST

## (undated) DEVICE — SPONGE STICK WITH PVP-I: Brand: KENDALL

## (undated) DEVICE — 6617 IOBAN II PATIENT ISOLATION DRAPE 5/BX,4BX/CS: Brand: STERI-DRAPE™ IOBAN™ 2

## (undated) DEVICE — INSTRUMENT FEE

## (undated) DEVICE — Device

## (undated) DEVICE — SUTURE NABSB OTHCRD VIOL TIE

## (undated) DEVICE — CSTM LAPAROTOMY DRAPE PACK: Brand: MEDLINE INDUSTRIES, INC.

## (undated) DEVICE — BLADELESS OBTURATOR

## (undated) DEVICE — 5.5MM ROUND FAST CUTTING BUR

## (undated) DEVICE — KENDALL SCD EXPRESS SLEEVES, KNEE LENGTH, MEDIUM: Brand: KENDALL SCD

## (undated) DEVICE — 3M(TM) TEGADERM(TM) TRANSPARENT FILM DRESSING FRAME STYLE 9505W: Brand: 3M™ TEGADERM™

## (undated) DEVICE — SUTURE VLOC 90 3-0 9\" 0644

## (undated) DEVICE — VIOLET BRAIDED (POLYGLACTIN 910), SYNTHETIC ABSORBABLE SUTURE: Brand: COATED VICRYL

## (undated) DEVICE — SUTURE VICRYL 2-0 CP-1

## (undated) DEVICE — COVER WAND RF DETECT

## (undated) DEVICE — BLADE ELECTRODE: Brand: EDGE

## (undated) DEVICE — LAMINECTOMY ARM CRADLE FOAM POSITIONER: Brand: CARDINAL HEALTH

## (undated) DEVICE — SPECIMEN CONTAINER,POSITIVE SEAL INDICATOR, OR PACKAGED: Brand: PRECISION

## (undated) DEVICE — CHLORAPREP 26ML APPLICATOR

## (undated) DEVICE — DECANTER BAG 9": Brand: MEDLINE INDUSTRIES, INC.

## (undated) DEVICE — DRAPE C-ARM UNIVERSAL

## (undated) DEVICE — SOL  .9 1000ML BTL

## (undated) DEVICE — 40580 - THE PINK PAD - ADVANCED TRENDELENBURG POSITIONING KIT: Brand: 40580 - THE PINK PAD - ADVANCED TRENDELENBURG POSITIONING KIT

## (undated) DEVICE — GOWN,SIRUS,FABRIC-REINFORCED,X-LARGE: Brand: MEDLINE

## (undated) DEVICE — DRAPE HALF 40X58 DYNJP2410

## (undated) DEVICE — SUTURE ABSORB STRAT 23CML 26MM

## (undated) DEVICE — PILLOW ABDUCTION HIP SM

## (undated) DEVICE — FENESTRATED BIPOLAR FORCEPS: Brand: ENDOWRIST

## (undated) DEVICE — VISUALIZATION SYSTEM: Brand: CLEARIFY

## (undated) DEVICE — GLOVE RADIATION SZ 8-1/2

## (undated) DEVICE — BLADELESS OBTURATOR: Brand: WECK VISTA

## (undated) DEVICE — ENDOPATH ULTRA VERESS INSUFFLATION NEEDLES WITH LUER LOCK CONNECTORS: Brand: ENDOPATH

## (undated) DEVICE — PADDING CAST COTTON  4

## (undated) DEVICE — LAP CHOLE/APPY CDS-LF: Brand: MEDLINE INDUSTRIES, INC.

## (undated) DEVICE — GAMMEX® NON-LATEX PI TEXTURED SIZE 7.5, STERILE POLYISOPRENE POWDER-FREE SURGICAL GLOVE: Brand: GAMMEX

## (undated) DEVICE — Device: Brand: PLUMEPEN

## (undated) DEVICE — MONOPOLAR CURVED SCISSORS: Brand: ENDOWRIST

## (undated) DEVICE — 3M™ IOBAN™ 2 ANTIMICROBIAL INCISE DRAPE 6651EZ: Brand: IOBAN™ 2

## (undated) DEVICE — ARM DRAPE

## (undated) DEVICE — COVER,MAYO STAND,STERILE: Brand: MEDLINE

## (undated) DEVICE — TOTAL HIP CDS: Brand: MEDLINE INDUSTRIES, INC.

## (undated) DEVICE — 3M™ COBAN™ NL STERILE NON-LATEX SELF-ADHERENT WRAP, 2084S, 4 IN X 5 YD (10 CM X 4,5 M), 18 ROLLS/CASE: Brand: 3M™ COBAN™

## (undated) NOTE — LETTER
Date: 10/9/2017    Patient Name: Irma Epps          To Whom it may concern: The above patient was seen at the [facility] for treatment of a medical condition.       This patient should be excused from attending work/school from 10/9/17 t

## (undated) NOTE — MR AVS SNAPSHOT
[address] [facility] [address] [phone]               Thank you for choosing us for your health care visit with Hubert Santos MD.  We are glad to serve you and happy to provide you with this summary of your Assoc Dx:  Flu-like symptoms [R68.89], Bilateral flank pain [R10.9]           Urine Culture, Routine [E]    Complete by:  Feb 13, 2017 (Approximate)    Assoc Dx:  Flu-like symptoms [R68.89], Bilateral flank pain [R10.9], Benign non-nodular prostatic hyper EAT THESE FOODS MORE OFTEN: EAT THESE FOODS LESS OFTEN:   Make half your plate fruits and vegetables Highly refined, white starches including white bread, rice and pasta   Eat plenty of protein, keep the fat content low Sugars:  sodas and sports drinks, ca

## (undated) NOTE — MR AVS SNAPSHOT
[address] [place] [facility] [id number] [phone]               Thank you for choosing us for your health care visit with Meghan Mak MD.  We are glad to serve you and happy to provide you with this summary of your information, go to [email or web address] and click on the Sign Up Now link in the [employer] box. Enter your "[employer] Activation Code exactly as it appears below along with your Zip Code and Date of Birth to complete the sign-up process.  If you do

## (undated) NOTE — MR AVS SNAPSHOT
[address]               Thank you for choosing us for your health care visit with roxanna weems PT. We are glad to serve you and happy to provide you with this summary of your visit.   Ple exceed 10 tabs in 24 hours. Commonly known as:  NORCO           * HYDROcodone-acetaminophen  MG Tabs   Take 1-2 tablets by mouth every 4 (four) hours as needed for Pain. Not to exceed 10 tabs in 24 hours. Do not dispense until 3/31/17.    Commonly k

## (undated) NOTE — LETTER
04/05/21    Dear Ag Hines MD,    I am seeing Fady Young in the office for evaluation and treatment.          Assessment   Right inguinal hernia  (primary encounter diagnosis)  Follicular lymphoma of intra-abdominal lymph nodes, unspecified grade ([facility] [address]

## (undated) NOTE — ED AVS SNAPSHOT
Janie Glass   MRN: [id number]    Department:  [facility] Emergency Department   Date of Visit:  10/29/2019           Disclosure     Insurance plans vary and the physician(s) referred by the ER may not be covered by your plan.  Please contact your i tell this physician (or your personal doctor if your instructions are to return to your personal doctor) about any new or lasting problems. The primary care or specialist physician will see patients referred from the [facility] Emergency Department.  Zari Choudhary

## (undated) NOTE — LETTER
[address] [place] [address],  [address]  [address] [address]  [phone]    06/15/20      To whom it may concern,  This letter is in regards to Mary Mak :1950.  Please excuse him from work until 2020

## (undated) NOTE — MR AVS SNAPSHOT
[address]               Thank you for choosing us for your health care visit with roxanna weems PT. We are glad to serve you and happy to provide you with this summary of your visit.   Ple Take 1 capsule (100 mg total) by mouth 2 (two) times daily. Commonly known as:  COLACE           Ferrous Sulfate 325 (65 Fe) MG Tabs   Take 1 tablet (325 mg total) by mouth daily.            * HYDROcodone-acetaminophen  MG Tabs   Take 1-2 tablets by

## (undated) NOTE — LETTER
19    Patient: Sanjana Shelley  : 1950 Visit date: 2019    Dear  Dr. Arti العلي MD,    Thank you for referring Sanjana Shelley to my practice. Please find my assessment and plan below.                 Assessment   Inguinal hernia of right side wit

## (undated) NOTE — MR AVS SNAPSHOT
[address] [facility] [address] [phone]               Thank you for choosing us for your health care visit with Anastasia Cheadle, MD.  We are glad to serve you and happy to provide you with this summary of your your Zip Code and Date of Birth to complete the sign-up process. If you do not sign up before the expiration date, you must request a new code.     Your unique [employer] Access Code: [email or web address]  Expires: 6/13/2017 10:38 AM    If you have questions, you can c

## (undated) NOTE — MR AVS SNAPSHOT
[address]               Thank you for choosing us for your health care visit with roxanna weems PT. We are glad to serve you and happy to provide you with this summary of your visit.   Ple * Notice: This list has 2 medication(s) that are the same as other medications prescribed for you. Read the directions carefully, and ask your doctor or other care provider to review them with you.             Carola     Sign up for [employer], your secure

## (undated) NOTE — MR AVS SNAPSHOT
[address]               Thank you for choosing us for your health care visit with roxanna weems PT. We are glad to serve you and happy to provide you with this summary of your visit.   Ple Ferrous Sulfate 325 (65 Fe) MG Tabs   Take 1 tablet (325 mg total) by mouth daily. * HYDROcodone-acetaminophen  MG Tabs   Take 1-2 tablets by mouth every 4 (four) hours as needed for Pain. Not to exceed 10 tabs in 24 hours.    Commonly know

## (undated) NOTE — MR AVS SNAPSHOT
[address]               Thank you for choosing us for your health care visit with roxanna weems PT. We are glad to serve you and happy to provide you with this summary of your visit.   Ple exceed 10 tabs in 24 hours. Commonly known as:  NORCO           * HYDROcodone-acetaminophen  MG Tabs   Take 1-2 tablets by mouth every 4 (four) hours as needed for Pain. Not to exceed 10 tabs in 24 hours. Do not dispense until 3/31/17.    Commonly k

## (undated) NOTE — LETTER
To Whom It May Concern:    Roman Marte has been under our care regarding ongoing medical issues. He is able to return to work as of 10/25/19. He may resume his usual activities.                                                 Please feel free to contact us

## (undated) NOTE — Clinical Note
Patient Name: Janet Alarcon  YOB: 1950          MRN number:  [id number]  Date:  4/5/2017  Referring Physician:  Derek De Jesus         LOWER EXTREMITY EVALUATION:    Referring Physician: Dr. Hogan Mort: Right Total Hip Arthroplasty     Date of Se AROM/PROM is full within hip precautions. He has limited hip extension with a muscular end feel. Knees and ankles are good. Accessory motion: Deferred. Flexibility:  Moderate restrictions of the bilateral hip flexors R>>L.      Strength/MMT: [facility] [de-identified] certification required: Yes  I certify the need for these services furnished under this plan of treatment and while under my care.     X___________________________________________________ Date____________________    Certification From: 0/7/9844

## (undated) NOTE — MR AVS SNAPSHOT
[address]               Thank you for choosing us for your health care visit with roxanna weems PT. We are glad to serve you and happy to provide you with this summary of your visit.   Ple

## (undated) NOTE — MR AVS SNAPSHOT
[address]               Thank you for choosing us for your health care visit with roxanna weems PT. We are glad to serve you and happy to provide you with this summary of your visit.   Ple exceed 10 tabs in 24 hours. Do not dispense until 3/31/17. Commonly known as:  NORCO           rivaroxaban 10 MG Tabs   Take 1 tablet (10 mg total) by mouth daily. Commonly known as:  Olu Holland           * Notice:   This list has 2 medication(s) that are

## (undated) NOTE — LETTER
02/18/19    Dear Ines Aguilar MD,    I am seeing Stefan Kvng in the office today to discuss recent CT scan and potential surgery.         Assessment   Mesenteric adenitis  (primary encounter diagnosis)  Right inguinal hernia    Planbody   I personally revollie CT scan to get an interval image of the groin hernia as well, while reevaluating the area of mesenteric lymphadenitis. Thank you for allowing me to participate in your patient's care.     Best regards,  Shell Vyas MD

## (undated) NOTE — MR AVS SNAPSHOT
[address]               Thank you for choosing us for your health care visit with roxanna weems PT. We are glad to serve you and happy to provide you with this summary of your visit.   Ple

## (undated) NOTE — MR AVS SNAPSHOT
[address]               Thank you for choosing us for your health care visit with roxanna weems PT. We are glad to serve you and happy to provide you with this summary of your visit.   Ple Ferrous Sulfate 325 (65 Fe) MG Tabs   Take 1 tablet (325 mg total) by mouth daily. * HYDROcodone-acetaminophen  MG Tabs   Take 1-2 tablets by mouth every 4 (four) hours as needed for Pain. Not to exceed 10 tabs in 24 hours.    Commonly know

## (undated) NOTE — LETTER
23    Patient: Joselyn Ferris  : 1950 Visit date: 2023    Dear  Maci Borges MD    Thank you for referring Joselyn Ferris to my practice. Please find my assessment and plan below. I saw Suly Mark in the office, he presents for colonoscopy. His last colonoscopy was approximately 5 years ago performed at the [place]. He is currently scheduled.   Thank you Carroll  Sincerely,       Reuben Pang DO   CC: No Recipients

## (undated) NOTE — MR AVS SNAPSHOT
[address]               Thank you for choosing us for your health care visit with roxanna weems PT. We are glad to serve you and happy to provide you with this summary of your visit.   Ple Enter your [employer] Activation Code exactly as it appears below along with your Zip Code and Date of Birth to complete the sign-up process. If you do not sign up before the expiration date, you must request a new code.     Your unique [employer] Access Code: [email or web address]

## (undated) NOTE — MR AVS SNAPSHOT
[address]               Thank you for choosing us for your health care visit with roxanna weems PT. We are glad to serve you and happy to provide you with this summary of your visit.   Ple Your unique [employer] Access Code: [email or web address]  Expires: 6/13/2017 10:38 AM    If you have questions, you can call [phone] to talk to our [facility] Staff. Remember, [employer] is NOT to be used for urgent needs. For medical emergencies, dial 911.

## (undated) NOTE — IP AVS SNAPSHOT
[facility] [place] [address] [place], [address] ~ [phone]                Discharge Summary   3/22/2017    Martha Marie           Admission Information        Provider Department    3/22/2017 Flory Choe MD  3sw-A         Thank y exceed 10 tabs in 24 hours. Do not dispense until 3/31/17. Darci Silver                           PEG 3350 Pack   Last time this was given:  17 g on 3/23/2017  8:55 AM   Commonly known as:  MIRALAX        Take 17 g by mouth daily as needed.  Over- Bring a paper prescription for each of these medications    - HYDROcodone-acetaminophen  MG Tabs  - HYDROcodone-acetaminophen  MG Tabs      Information about where to get these medications is not yet available     !  Ask your nurse or doctor ab than 10 tabs daily. Do not drive while you are under influence of Norco.  Ferrous sulfate (iron) 325 mg 1 tab daily: This medication promotes your body to replace blood lost  from surgery. Colace 100 mg 1 tab twice a day:  Helps to prevent constipation. ? Avoid smoking. It is known to cause breathing problems and can decrease the rate of healing. Incision care/Dressing changes  ? Wash hands before and after dressing changes.     FOR MEDIPORE/COVERLET DRESSINGS:  Change dressing daily using Medipore/cove ? Keep pain manageable; pain should not disrupt your sleep or activities like getting out of bed or walking. ? You may need pain medication regularly (every 4-6 hours) the first 2 weeks and then begin to decrease how often you are taking it.   ? Take pain ? Do not allow others or pets to touch your incision. ? Avoid people that have colds or the flu. ? Your surgeon may recommend that you take antibiotics before you undergo any dental or other invasive surgical procedures after your joint replacement.  Al ? Inability to walk or put weight on your surgical leg      Signs of blood clot  ? Pain, excessive tenderness, redness, or swelling in leg or calf (other than incision site).             Go directly to the ER or CALL 911 if  you:  ? become short of breath Follow-up Information     Follow up with Rodger Young PA-C. Schedule an appointment as soon as possible for a visit in 2 weeks.     Specialty:  Physician Assistant    Why:  Dr. Karyle Aye physician assistant    Contact information:    [address] [facility], [address] [address]   [facility]: [phone]    [facility]: [facility] and   [address] We are concerned for your overall well being:    - If you are a smoker or have smoked in the last 12 months, we encourage you to explore options for quitting.     - If you have concerns related to behavioral health issues or thoughts of harming yourself, provide you with additional printed information. Not all patients will experience these side effects or respond to medications the same. Please call your provider or healthcare team if you have any questions regarding your medications while at home. movement in 2+ days, diarrhea

## (undated) NOTE — Clinical Note
06/02/2017        Keke Aquino  [address] [facility] [address]      Dear Roman Lopez,    [address] records indicate that you have outstanding lab work and or testing that was ordered for you and has not yet been completed:      PSA, Total and Free [E]

## (undated) NOTE — LETTER
05/16/19        Anson Oswald  [address] [facility] [address]      Dear Daniel Juan,    [address] [facility] records indicate that you have outstanding lab work and or testing that was ordered for you and has not yet been completed:  Orders Placed This Encounter  CT

## (undated) NOTE — Clinical Note
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 He states that his R groin is bothering him more lately and would like to see you. Thanks again!

## (undated) NOTE — Clinical Note
Date: 2/15/2017    Patient Name: Leah Mckinnon          To Whom it may concern: The above patient was seen at the [facility] for treatment of a medical condition.     This patient should be excused from attending work from 2/13/17 through 2/

## (undated) NOTE — MR AVS SNAPSHOT
[address] [place] [id number] [phone]               Thank you for choosing us for your health care visit with roxanna weems PT. We are glad to serve you and happy to provide you with this summary of your visit.   Ple exceed 10 tabs in 24 hours. Do not dispense until 3/31/17. Commonly known as:  NORCO           rivaroxaban 10 MG Tabs   Take 1 tablet (10 mg total) by mouth daily. Commonly known as:  Linda Segovia           * Notice:   This list has 2 medication(s) that are

## (undated) NOTE — MR AVS SNAPSHOT
[address] [facility] [place] [place] [address] [phone]               Thank you for choosing us for your health care visit with Austin Coon MD.  We are glad to serve you and happy to provide you with this summary of your PH: [phone]    [address]:  [address]  [address] [facility] in [address] [place] [address], [address] [place] [address], [address]    [address] Take 1-2 tablets by mouth every 4 (four) hours as needed for Pain. Not to exceed 10 tabs in 24 hours. Do not dispense until 3/31/17. Commonly known as:  NORCO           rivaroxaban 10 MG Tabs   Take 1 tablet (10 mg total) by mouth daily.    Commonly known Aerobic physical activity Regular aerobic physical activity (e.g., brisk walking, light jogging, cycling, swimming, etc.) for a goal of at least 150 minutes per week. Moderation of alcohol consumption Men: limit to <= 2 drinks* per day.   Women and lighte

## (undated) NOTE — LETTER
Date: 10/21/2019    Patient Name: Felisa Poole          To Whom it may concern: The above patient was seen at the [facility] for treatment of a medical condition.     This patient should be excused from attending work until clearance from Mayra